# Patient Record
Sex: MALE | Race: WHITE | NOT HISPANIC OR LATINO | Employment: UNEMPLOYED | URBAN - NONMETROPOLITAN AREA
[De-identification: names, ages, dates, MRNs, and addresses within clinical notes are randomized per-mention and may not be internally consistent; named-entity substitution may affect disease eponyms.]

---

## 2020-03-07 ENCOUNTER — HOSPITAL ENCOUNTER (EMERGENCY)
Facility: HOSPITAL | Age: 30
Discharge: HOME | End: 2020-03-07
Admitting: EMERGENCY MEDICINE

## 2020-03-07 VITALS
TEMPERATURE: 97.8 F | RESPIRATION RATE: 16 BRPM | HEIGHT: 72 IN | DIASTOLIC BLOOD PRESSURE: 74 MMHG | WEIGHT: 212 LBS | SYSTOLIC BLOOD PRESSURE: 149 MMHG | OXYGEN SATURATION: 98 % | BODY MASS INDEX: 28.71 KG/M2 | HEART RATE: 73 BPM

## 2020-03-07 DIAGNOSIS — J02.9 PHARYNGITIS, UNSPECIFIED ETIOLOGY: Primary | ICD-10-CM

## 2020-03-07 DIAGNOSIS — J40 BRONCHITIS: ICD-10-CM

## 2020-03-07 LAB
FLUAV AG NPH QL: NEGATIVE
FLUBV AG NPH QL IA: NEGATIVE
S PYO AG THROAT QL: NEGATIVE

## 2020-03-07 PROCEDURE — 87804 INFLUENZA ASSAY W/OPTIC: CPT | Performed by: NURSE PRACTITIONER

## 2020-03-07 PROCEDURE — 87081 CULTURE SCREEN ONLY: CPT | Performed by: NURSE PRACTITIONER

## 2020-03-07 PROCEDURE — 87880 STREP A ASSAY W/OPTIC: CPT | Performed by: NURSE PRACTITIONER

## 2020-03-07 PROCEDURE — 99283 EMERGENCY DEPT VISIT LOW MDM: CPT

## 2020-03-07 RX ORDER — BENZONATATE 200 MG/1
200 CAPSULE ORAL 3 TIMES DAILY PRN
Qty: 15 CAPSULE | Refills: 0 | Status: SHIPPED | OUTPATIENT
Start: 2020-03-07 | End: 2020-07-14

## 2020-03-07 RX ORDER — CETIRIZINE HYDROCHLORIDE, PSEUDOEPHEDRINE HYDROCHLORIDE 5; 120 MG/1; MG/1
1 TABLET, FILM COATED, EXTENDED RELEASE ORAL 2 TIMES DAILY
Qty: 15 TABLET | Refills: 0 | Status: SHIPPED | OUTPATIENT
Start: 2020-03-07 | End: 2020-07-14

## 2020-03-07 RX ORDER — AZITHROMYCIN 250 MG/1
TABLET, FILM COATED ORAL
Qty: 6 TABLET | Refills: 0 | Status: SHIPPED | OUTPATIENT
Start: 2020-03-07 | End: 2020-07-14

## 2020-03-07 RX ORDER — ESCITALOPRAM OXALATE 20 MG/1
20 TABLET ORAL 2 TIMES DAILY
COMMUNITY
End: 2022-09-22

## 2020-03-07 RX ORDER — ONDANSETRON 4 MG/1
4 TABLET, ORALLY DISINTEGRATING ORAL EVERY 6 HOURS PRN
Qty: 10 TABLET | Refills: 0 | Status: SHIPPED | OUTPATIENT
Start: 2020-03-07 | End: 2020-07-14

## 2020-03-07 RX ORDER — FLUTICASONE PROPIONATE 50 MCG
2 SPRAY, SUSPENSION (ML) NASAL DAILY
Qty: 9.9 ML | Refills: 0 | Status: SHIPPED | OUTPATIENT
Start: 2020-03-07 | End: 2020-07-14
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
## 2020-03-09 LAB — BACTERIA SPEC AEROBE CULT: NORMAL

## 2020-05-06 ENCOUNTER — HOSPITAL ENCOUNTER (EMERGENCY)
Age: 30
Discharge: HOME | End: 2020-05-06
Payer: MEDICAID

## 2020-05-06 ENCOUNTER — APPOINTMENT (OUTPATIENT)
Dept: GENERAL RADIOLOGY | Age: 30
End: 2020-05-06
Payer: MEDICAID

## 2020-05-06 VITALS
BODY MASS INDEX: 27.09 KG/M2 | OXYGEN SATURATION: 96 % | TEMPERATURE: 97.3 F | WEIGHT: 200 LBS | RESPIRATION RATE: 18 BRPM | HEIGHT: 72 IN | HEART RATE: 86 BPM

## 2020-05-06 PROCEDURE — 99283 EMERGENCY DEPT VISIT LOW MDM: CPT

## 2020-05-06 PROCEDURE — 73660 X-RAY EXAM OF TOE(S): CPT

## 2020-05-06 RX ORDER — ESCITALOPRAM OXALATE 10 MG/1
10 TABLET ORAL DAILY
COMMUNITY
End: 2020-12-03

## 2020-05-06 RX ORDER — METHYLPREDNISOLONE 4 MG/1
TABLET ORAL
Qty: 1 KIT | Refills: 0 | Status: SHIPPED | OUTPATIENT
Start: 2020-05-06 | End: 2020-05-12

## 2020-05-06 RX ORDER — SULFAMETHOXAZOLE AND TRIMETHOPRIM 800; 160 MG/1; MG/1
1 TABLET ORAL 2 TIMES DAILY
Qty: 20 TABLET | Refills: 0 | Status: SHIPPED | OUTPATIENT
Start: 2020-05-06 | End: 2020-05-16

## 2020-05-06 RX ORDER — MELOXICAM 15 MG/1
15 TABLET ORAL DAILY
Qty: 30 TABLET | Refills: 0 | Status: SHIPPED | OUTPATIENT
Start: 2020-05-06 | End: 2020-12-03

## 2020-05-06 NOTE — ED PROVIDER NOTES
PM      PATIENT REFERRED TO:  [facility] EMERGENCY DEPT  [facility] [place]  [phone]    If symptoms worsen    Quirino Boeck, DPM  [address]  [phone]    Schedule an appointment as soon as possible for a visit         DISCHARGE MEDICATIONS:  Discharge Medication List as of 5/6/2020  6:22 PM      START taking these medications    Details   methylPREDNISolone (MEDROL, ADAMA,) 4 MG tablet Take by mouth., Disp-1 kit, R-0Print      meloxicam (MOBIC) 15 MG tablet Take 1 tablet by mouth daily, Disp-30 tablet, R-0Print      sulfamethoxazole-trimethoprim (BACTRIM DS) 800-160 MG per tablet Take 1 tablet by mouth 2 times daily for 10 days, Disp-20 tablet, R-0Print             (Please note that portions of this note were completed with a voice recognition program.  Efforts were made to edit the dictations but occasionallywords are mis-transcribed.)    Carie Yusuf [address] [place] [address]  05/07/20 5023
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
## 2020-05-07 ASSESSMENT — ENCOUNTER SYMPTOMS
COUGH: 0
PHOTOPHOBIA: 0
EYE ITCHING: 0
APNEA: 0
COLOR CHANGE: 0
BACK PAIN: 0
SHORTNESS OF BREATH: 0
EYE DISCHARGE: 0

## 2020-07-14 ENCOUNTER — OFFICE VISIT (OUTPATIENT)
Dept: INTERNAL MEDICINE | Facility: CLINIC | Age: 30
End: 2020-07-14

## 2020-07-14 VITALS
BODY MASS INDEX: 29.63 KG/M2 | OXYGEN SATURATION: 98 % | WEIGHT: 207 LBS | DIASTOLIC BLOOD PRESSURE: 80 MMHG | HEART RATE: 86 BPM | SYSTOLIC BLOOD PRESSURE: 120 MMHG | HEIGHT: 70 IN | TEMPERATURE: 98.4 F

## 2020-07-14 DIAGNOSIS — Z76.89 ENCOUNTER TO ESTABLISH CARE: Primary | ICD-10-CM

## 2020-07-14 PROCEDURE — 99211 OFF/OP EST MAY X REQ PHY/QHP: CPT | Performed by: NURSE PRACTITIONER
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
## 2020-08-26 ENCOUNTER — TELEPHONE (OUTPATIENT)
Dept: INTERNAL MEDICINE | Facility: CLINIC | Age: 30
End: 2020-08-26

## 2020-08-26 NOTE — TELEPHONE ENCOUNTER
Pt stopped by ofc requesting a release to return to work. Pt would not disclose any info. I advised pt he will need an appt to be evaluated, pt refused.    Please call pt at [phone].
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
## 2020-12-03 ENCOUNTER — HOSPITAL ENCOUNTER (EMERGENCY)
Age: 30
Discharge: HOME | End: 2020-12-03
Payer: MEDICAID

## 2020-12-03 VITALS
DIASTOLIC BLOOD PRESSURE: 89 MMHG | OXYGEN SATURATION: 98 % | HEART RATE: 84 BPM | RESPIRATION RATE: 16 BRPM | TEMPERATURE: 96.5 F | SYSTOLIC BLOOD PRESSURE: 136 MMHG

## 2020-12-03 PROCEDURE — 99283 EMERGENCY DEPT VISIT LOW MDM: CPT

## 2020-12-03 PROCEDURE — 10060 I&D ABSCESS SIMPLE/SINGLE: CPT

## 2020-12-03 RX ORDER — SULFAMETHOXAZOLE AND TRIMETHOPRIM 800; 160 MG/1; MG/1
1 TABLET ORAL 2 TIMES DAILY
Qty: 20 TABLET | Refills: 0 | Status: SHIPPED | OUTPATIENT
Start: 2020-12-03 | End: 2020-12-13

## 2020-12-03 ASSESSMENT — PAIN DESCRIPTION - LOCATION: LOCATION: FACE

## 2020-12-03 ASSESSMENT — PAIN DESCRIPTION - DESCRIPTORS: DESCRIPTORS: THROBBING;SHARP

## 2020-12-03 ASSESSMENT — ENCOUNTER SYMPTOMS
VOMITING: 0
ABDOMINAL PAIN: 0
COLOR CHANGE: 0

## 2020-12-03 ASSESSMENT — PAIN SCALES - GENERAL: PAINLEVEL_OUTOF10: 6

## 2020-12-03 NOTE — ED NOTES
Patient declined dressing over wound, patient also declined Bactrim and DC papers, no distress noted, no active bleeding from abscess incision     Eric Gupta, RN  12/03/20 91 Ally Vasquez, RN  12/03/20 5403

## 2020-12-03 NOTE — ED PROVIDER NOTES
[facility] EMERGENCY DEPT  eMERGENCY dEPARTMENT eNCOUnter      Pt Name: Cj Puga  MRN: [id number]  [place] 1990  Date of evaluation: 12/3/2020  Provider: Kristina Sahu, [address]       Chief Complaint   Patient presents with    Abscess     to chin \"for while\"         HISTORY OF PRESENT ILLNESS   (Location/Symptom, Timing/Onset,Context/Setting, Quality, Duration, Modifying Factors, Severity)  Note limiting factors. Cj Puga is a 26 y.o. male who presents to the emergency department with  An abscess to his chin. HAs been there for a year but just recently became sore and red. Pt squeezed out thick pus. HAS had abscesses cut open before. No fever or vomtiing. Pt insists that it needs to be cut open. \"otherwise I will take a knife to it myself\"    The history is provided by the patient. Abscess   Location:  Face  Facial abscess location:  Chin  Size:  2  Abscess quality: painful and redness    Red streaking: no    Duration:  3 days  Progression:  Worsening  Pain details:     Timing:  Constant  Chronicity:  Chronic  Associated symptoms: no fever and no vomiting    Risk factors: prior abscess        NursingNotes were reviewed. REVIEW OF SYSTEMS    (2-9 systems for level 4, 10 or more for level 5)     Review of Systems   Constitutional: Negative for activity change and fever. Cardiovascular: Negative for chest pain. Gastrointestinal: Negative for abdominal pain and vomiting. Skin: Positive for wound. Negative for color change. Except as noted above the remainder of the review of systems was reviewed and negative.        PAST MEDICAL HISTORY     Past Medical History:   Diagnosis Date    Anxiety     PTSD (post-traumatic stress disorder)          SURGICALHISTORY       Past Surgical History:   Procedure Laterality Date    ARM SURGERY Left     FOOT SURGERY Right     WRIST SURGERY Left          CURRENT MEDICATIONS       Discharge Medication List as of 12/3/2020 4:22 PM          ALLERGIES     Nuts [peanut-containing drug products]    FAMILY HISTORY     History reviewed. No pertinent family history. SOCIAL HISTORY       Social History     Socioeconomic History    Marital status:      Spouse name: None    Number of children: None    Years of education: None    Highest education level: None   Occupational History    None   Social Needs    Financial resource strain: None    Food insecurity     Worry: None     Inability: None    Transportation needs     Medical: None     Non-medical: None   Tobacco Use    Smoking status: Current Every Day Smoker     Packs/day: 1.00     Types: Cigarettes    Smokeless tobacco: Never Used   Substance and Sexual Activity    Alcohol use: Not Currently    Drug use: Yes     Types: Marijuana    Sexual activity: None   Lifestyle    Physical activity     Days per week: None     Minutes per session: None    Stress: None   Relationships    Social connections     Talks on phone: None     Gets together: None     Attends Congregational service: None     Active member of club or organization: None     Attends meetings of clubs or organizations: None     Relationship status: None    Intimate partner violence     Fear of current or ex partner: None     Emotionally abused: None     Physically abused: None     Forced sexual activity: None   Other Topics Concern    None   Social History Narrative    None       SCREENINGS      @FLOW([id number])@      PHYSICAL EXAM    (up to 7 for level 4, 8 or more for level 5)     ED Triage Vitals [12/03/20 1531]   BP Temp Temp src Pulse Resp SpO2 Height Weight   136/89 96.5 °F (35.8 °C) -- 84 16 98 % -- --       Physical Exam  Vitals signs and nursing note reviewed. Constitutional:       Appearance: He is well-developed. HENT:      Head: Normocephalic and atraumatic. Eyes:      General: No scleral icterus. Right eye: No discharge. Left eye: No discharge.    Neck:      Musculoskeletal: Normal range of motion and neck supple. Cardiovascular:      Rate and Rhythm: Normal rate. Pulmonary:      Effort: No respiratory distress. Neurological:      Mental Status: He is alert. Psychiatric:         Behavior: Behavior normal.         DIAGNOSTIC RESULTS     EKG: All EKG's are interpreted by the Emergency Department Physician who either signs or Co-signsthis chart in the absence of a cardiologist.        RADIOLOGY:   Non-plain filmimages such as CT, Ultrasound and MRI are read by the radiologist. Plain radiographic images are visualized and preliminarily interpreted by the emergency physician with the below findings:      Interpretation per the Radiologist below, if available at the time of this note:    No orders to display         ED BEDSIDEULTRASOUND:   Performed by ED Physician -none    LABS:  Labs Reviewed - No data to display    All other labs were within normal range or not returned as of this dictation. EMERGENCY DEPARTMENT COURSE and DIFFERENTIALDIAGNOSIS/MDM:   Vitals:    Vitals:    12/03/20 1531   BP: 136/89   Pulse: 84   Resp: 16   Temp: 96.5 °F (35.8 °C)   SpO2: 98%           MDM  Pt left before gettiing his papers or antibiotics      CONSULTS:  None    PROCEDURES:  Unless otherwise noted below, none     Incision/Drainage    Date/Time: 12/3/2020 4:16 PM  Performed by: TAMIKO Quiñonez  Authorized by: TAMIKO Quiñonez     Consent:     Consent obtained:  Verbal    Consent given by:  Patient    Risks discussed:  Incomplete drainage  Location:     Type:  Abscess    Size:  2    Location:  Head    Head/neck location: chin. Pre-procedure details:     Skin preparation:  Betadine  Anesthesia (see MAR for exact dosages): Anesthesia method:  Local infiltration    Local anesthetic:  Lidocaine 1% w/o epi  Procedure details:     Incision types:  Stab incision    Incision depth:  Dermal    Scalpel blade:  11    Drainage:  Bloody    Drainage amount:   Moderate    Wound treatment:  Wound left open  Post-procedure details:     Patient tolerance of procedure: Tolerated well, no immediate complications        FINAL IMPRESSION      1. Sebaceous cyst        DISPOSITION/PLAN   DISPOSITION        PATIENT REFERRED TO:  [facility]   [facility] [id number] [phone]          DISCHARGE MEDICATIONS:  Discharge Medication List as of 12/3/2020  4:22 PM      START taking these medications    Details   sulfamethoxazole-trimethoprim (BACTRIM DS;SEPTRA DS) 800-160 MG per tablet Take 1 tablet by mouth 2 times daily for 10 days, Disp-20 tablet,R-0Print                (Please note that portions of this note were completed with a voice recognitionprogram.  Efforts were made to edit the dictations but occasionally words are mis-transcribed.)    TAMIKO Quiñonez (electronically signed)         TAMIKO Quiñonez  12/03/20 0813

## 2021-02-02 ENCOUNTER — HOSPITAL ENCOUNTER (EMERGENCY)
Age: 31
Discharge: HOME | End: 2021-02-02
Payer: MEDICAID

## 2021-02-02 VITALS
RESPIRATION RATE: 18 BRPM | OXYGEN SATURATION: 96 % | DIASTOLIC BLOOD PRESSURE: 96 MMHG | WEIGHT: 230 LBS | TEMPERATURE: 97.2 F | BODY MASS INDEX: 31.15 KG/M2 | HEIGHT: 72 IN | HEART RATE: 83 BPM | SYSTOLIC BLOOD PRESSURE: 137 MMHG

## 2021-02-02 DIAGNOSIS — M79.605 PAIN OF BACK AND LEFT LOWER EXTREMITY: Primary | ICD-10-CM

## 2021-02-02 DIAGNOSIS — M54.9 PAIN OF BACK AND LEFT LOWER EXTREMITY: Primary | ICD-10-CM

## 2021-02-02 PROCEDURE — 99282 EMERGENCY DEPT VISIT SF MDM: CPT

## 2021-02-02 RX ORDER — KETOROLAC TROMETHAMINE 10 MG/1
10 TABLET, FILM COATED ORAL EVERY 6 HOURS PRN
Qty: 20 TABLET | Refills: 0 | Status: SHIPPED | OUTPATIENT
Start: 2021-02-02 | End: 2022-09-18

## 2021-02-02 RX ORDER — KETOROLAC TROMETHAMINE 30 MG/ML
30 INJECTION, SOLUTION INTRAMUSCULAR; INTRAVENOUS ONCE
Status: DISCONTINUED | OUTPATIENT
Start: 2021-02-02 | End: 2021-02-02 | Stop reason: HOSPADM

## 2021-02-02 RX ORDER — PREDNISONE 10 MG/1
10 TABLET ORAL DAILY
Qty: 10 TABLET | Refills: 0 | Status: SHIPPED | OUTPATIENT
Start: 2021-02-02 | End: 2021-02-12

## 2021-02-02 RX ORDER — DEXAMETHASONE SODIUM PHOSPHATE 4 MG/ML
4 INJECTION, SOLUTION INTRA-ARTICULAR; INTRALESIONAL; INTRAMUSCULAR; INTRAVENOUS; SOFT TISSUE ONCE
Status: DISCONTINUED | OUTPATIENT
Start: 2021-02-02 | End: 2021-02-02 | Stop reason: HOSPADM

## 2021-02-02 ASSESSMENT — PAIN SCALES - GENERAL: PAINLEVEL_OUTOF10: 9

## 2021-02-02 ASSESSMENT — ENCOUNTER SYMPTOMS
COUGH: 0
PHOTOPHOBIA: 0
COLOR CHANGE: 0
BACK PAIN: 1
SHORTNESS OF BREATH: 0
EYE DISCHARGE: 0
EYE ITCHING: 0
APNEA: 0
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
## 2021-02-03 NOTE — ED NOTES
Pt declined toradol and decadron stating \"If that's all yall are gonna do, I'm leaving\"   PA notified.    Pt [facility], RN  02/02/21 2034

## 2021-02-03 NOTE — ED NOTES
Prescriptions placed in shred box with Ching Moore RN; pt left before dc teaching       Cj Lyon RN  02/02/21 2042

## 2021-08-12 ENCOUNTER — HOSPITAL ENCOUNTER (EMERGENCY)
Age: 31
Discharge: HOME | End: 2021-08-12
Payer: MEDICARE

## 2021-08-12 VITALS
HEART RATE: 83 BPM | RESPIRATION RATE: 18 BRPM | OXYGEN SATURATION: 95 % | TEMPERATURE: 98.7 F | SYSTOLIC BLOOD PRESSURE: 127 MMHG | DIASTOLIC BLOOD PRESSURE: 86 MMHG

## 2021-08-12 DIAGNOSIS — M25.532 WRIST PAIN, LEFT: ICD-10-CM

## 2021-08-12 DIAGNOSIS — G89.29 TOE PAIN, CHRONIC, RIGHT: Primary | ICD-10-CM

## 2021-08-12 DIAGNOSIS — G43.909 MIGRAINE WITHOUT STATUS MIGRAINOSUS, NOT INTRACTABLE, UNSPECIFIED MIGRAINE TYPE: ICD-10-CM

## 2021-08-12 DIAGNOSIS — M79.674 TOE PAIN, CHRONIC, RIGHT: Primary | ICD-10-CM

## 2021-08-12 PROCEDURE — 99282 EMERGENCY DEPT VISIT SF MDM: CPT

## 2021-08-12 RX ORDER — IBUPROFEN 600 MG/1
600 TABLET ORAL 4 TIMES DAILY PRN
Qty: 40 TABLET | Refills: 0 | Status: SHIPPED | OUTPATIENT
Start: 2021-08-12 | End: 2022-09-18

## 2021-08-12 ASSESSMENT — PAIN DESCRIPTION - LOCATION: LOCATION: HEAD

## 2021-08-12 ASSESSMENT — PAIN SCALES - GENERAL: PAINLEVEL_OUTOF10: 7
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
## 2021-08-12 NOTE — ED TRIAGE NOTES
Pt here with multiple complaints.   Pt notes pain to right foot toes, wants to have them amputated, pain to left wrist and headache that is worse than his baseline
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
## 2022-08-26 ENCOUNTER — HOSPITAL ENCOUNTER (EMERGENCY)
Age: 32
Discharge: HOME | End: 2022-08-26
Payer: MEDICARE

## 2022-08-26 VITALS
HEART RATE: 75 BPM | RESPIRATION RATE: 18 BRPM | WEIGHT: 230 LBS | SYSTOLIC BLOOD PRESSURE: 121 MMHG | OXYGEN SATURATION: 95 % | DIASTOLIC BLOOD PRESSURE: 78 MMHG | BODY MASS INDEX: 31.19 KG/M2 | TEMPERATURE: 98 F

## 2022-08-26 DIAGNOSIS — T50.Z95A ADVERSE EFFECT OF VACCINE, INITIAL ENCOUNTER: ICD-10-CM

## 2022-08-26 DIAGNOSIS — R19.7 DIARRHEA, UNSPECIFIED TYPE: ICD-10-CM

## 2022-08-26 DIAGNOSIS — Z20.822 COVID-19 VIRUS NOT DETECTED: Primary | ICD-10-CM

## 2022-08-26 LAB — SARS-COV-2, NAAT: NOT DETECTED

## 2022-08-26 PROCEDURE — 99283 EMERGENCY DEPT VISIT LOW MDM: CPT

## 2022-08-26 PROCEDURE — 87635 SARS-COV-2 COVID-19 AMP PRB: CPT

## 2022-08-26 ASSESSMENT — ENCOUNTER SYMPTOMS
EYES NEGATIVE: 1
VOMITING: 0
ABDOMINAL DISTENTION: 0
BLOOD IN STOOL: 0
DIARRHEA: 1
CONSTIPATION: 0
NAUSEA: 1
SORE THROAT: 0
RESPIRATORY NEGATIVE: 1
COUGH: 0
ABDOMINAL PAIN: 0

## 2022-08-26 ASSESSMENT — PAIN - FUNCTIONAL ASSESSMENT
PAIN_FUNCTIONAL_ASSESSMENT: NONE - DENIES PAIN
PAIN_FUNCTIONAL_ASSESSMENT: NONE - DENIES PAIN

## 2022-08-26 NOTE — DISCHARGE INSTRUCTIONS
Today your COVID test is coming back negative. It is likely that your symptoms this morning were a reaction to the vaccine. Please continue to stay well rested, well hydrated, use Tylenol and Motrin as needed for body aches and fevers. Should you be able to produce a stool sample please return it within the next 48 hours for further testing. Please avoid any antidiarrheal medications as these will keep toxins in your body longer than normal.  Please follow-up with your primary care provider within the next 48 hours for reevaluation. Should she develop any new or worsening symptoms please return to the ER for further evaluation.

## 2022-08-26 NOTE — Clinical Note
Janki Ayon was seen and treated in our emergency department on 8/26/2022. He may return to work on 08/27/2022. Mr. Janki Ayon was seen in the ED today for a likely vaccine reaction. He is COVID negative. He will require further stool testing to rule out salmonella. He is medically cleared to return to work at this time. If you have any questions or concerns, please don't hesitate to call.       Danilo Dallas PA-C

## 2022-08-26 NOTE — ED PROVIDER NOTES
[facility] EMERGENCY DEPT  EMERGENCY DEPARTMENT ENCOUNTER      Pt Name: Surya Hu  MRN: [id number]  [place] 1990  Date of evaluation: 8/26/2022  Provider: Gaetano Gaucher, PA-C    CHIEF COMPLAINT       Chief Complaint   Patient presents with    Concern For COVID-19         HISTORY OF PRESENT ILLNESS   (Location/Symptom, Timing/Onset, Context/Setting, Quality, Duration, Modifying Factors, Severity)  Note limiting factors. HPI      Surya Hu is a 27 y.o. male who presents to the emergency department with flulike symptoms. PMH significant for PTSD and anxiety. Patient states on 7/25/2022 he began working at the [employer] and the following day received his first [employer]. Patient states yesterday morning he woke up with sudden onset of body aches, nausea, diarrhea, and \"just feeling bad. \"  Patient states that he was at work today where he received his second dose of the Moderna vaccine and 15 minutes later had lightheadedness and felt as though he was near syncopal.  Patient denies falling, striking his head, or any syncopal events. Patient states that this was a witnessed event by his coworkers for which he was advised to seek further evaluation. Patient denies any abdominal pain, fevers, chills, diaphoresis, or vomiting. Patient states over the past week is his first exposure to the chickens however he denies any other known sick contact. Patient denies any medication use for his symptoms. Records reviewed show patient last seen in the ED on 8/12/2021 for chronic right toe pain, wrist pain, migraine. No outpatient visits since. Nursing Notes were reviewed. REVIEW OF SYSTEMS    (2-9 systems for level 4, 10 or more for level 5)     Review of Systems   Constitutional: Negative. Negative for chills, diaphoresis and fever. HENT:  Positive for congestion. Negative for sore throat. Eyes: Negative. Respiratory: Negative. Negative for cough. Cardiovascular: Negative. Gastrointestinal:  Positive for diarrhea and nausea. Negative for abdominal distention, abdominal pain, blood in stool, constipation and vomiting. Genitourinary: Negative. Musculoskeletal:  Positive for myalgias. Skin: Negative. Neurological:  Positive for light-headedness and headaches. Negative for syncope and weakness. Psychiatric/Behavioral: Negative. All other systems reviewed and are negative. Except as noted above the remainder of the review of systems was reviewed and negative. PAST MEDICAL HISTORY     Past Medical History:   Diagnosis Date    Anxiety     PTSD (post-traumatic stress disorder)          SURGICAL HISTORY       Past Surgical History:   Procedure Laterality Date    ANKLE SURGERY Right     ARM SURGERY Left     FOOT SURGERY Right     HIP SURGERY Right     WRIST SURGERY Left          CURRENT MEDICATIONS       Discharge Medication List as of 8/26/2022  2:54 PM        CONTINUE these medications which have NOT CHANGED    Details   ibuprofen (ADVIL;MOTRIN) 600 MG tablet Take 1 tablet by mouth 4 times daily as needed for Pain, Disp-40 tablet, R-0Normal      ketorolac (TORADOL) 10 MG tablet Take 1 tablet by mouth every 6 hours as needed for Pain, Disp-20 tablet, R-0Print             ALLERGIES     Nuts [peanut-containing drug products]    FAMILY HISTORY     No family history on file.        SOCIAL HISTORY       Social History     Socioeconomic History    Marital status:    Tobacco Use    Smoking status: Every Day     Packs/day: 1.00     Types: Cigarettes    Smokeless tobacco: Never   Substance and Sexual Activity    Alcohol use: Not Currently    Drug use: Yes     Types: Marijuana (Weed)       SCREENINGS         [place] Coma Scale  Eye Opening: Spontaneous  Best Verbal Response: Oriented  Best Motor Response: Obeys commands  [place] Coma Scale Score: 15                     CIWA Assessment  BP: 121/78  Heart Rate: 75                 PHYSICAL EXAM (up to 7 for level 4, 8 or more for level 5)     ED Triage Vitals [08/26/22 1216]   BP Temp Temp src Heart Rate Resp SpO2 Height Weight   121/78 98 °F (36.7 °C) -- 75 18 95 % -- 230 lb (104.3 kg)       Physical Exam  Vitals and nursing note reviewed. Constitutional:       General: He is not in acute distress. Appearance: Normal appearance. He is well-developed, well-groomed and overweight. He is not toxic-appearing or diaphoretic. HENT:      Head: Normocephalic. Right Ear: External ear normal.      Left Ear: External ear normal.      Mouth/Throat:      Mouth: Mucous membranes are moist.      Pharynx: Oropharynx is clear. Eyes:      Extraocular Movements: Extraocular movements intact. Conjunctiva/sclera: Conjunctivae normal.      Pupils: Pupils are equal, round, and reactive to light. Cardiovascular:      Rate and Rhythm: Normal rate and regular rhythm. Pulmonary:      Effort: Pulmonary effort is normal.      Breath sounds: Normal breath sounds. Abdominal:      General: Bowel sounds are normal.      Palpations: Abdomen is soft. Tenderness: There is no abdominal tenderness. There is no right CVA tenderness or left CVA tenderness. Musculoskeletal:         General: Normal range of motion. Cervical back: Normal range of motion and neck supple. Right lower leg: No edema. Left lower leg: No edema. Skin:     General: Skin is warm and dry. Neurological:      Mental Status: He is alert and oriented to person, place, and time. Gait: Gait normal.   Psychiatric:         Attention and Perception: Attention normal.         Mood and Affect: Mood is anxious. Behavior: Behavior is agitated. Behavior is cooperative. Thought Content:  Thought content normal.       DIAGNOSTIC RESULTS         RADIOLOGY:   Non-plain film images such as CT, Ultrasound and MRI are read by the radiologist. Plain radiographic images are visualized and preliminarily interpreted by the emergency physician with the below findings:        Interpretation per the Radiologist below, if available at the time of this note:    No orders to display         LABS:  Labs Reviewed   COVID-19, RAPID       All other labs were within normal range or not returned as of this dictation. EMERGENCY DEPARTMENT COURSE and DIFFERENTIAL DIAGNOSIS/MDM:   Vitals:    Vitals:    08/26/22 1216   BP: 121/78   Pulse: 75   Resp: 18   Temp: 98 °F (36.7 °C)   SpO2: 95%   Weight: 230 lb (104.3 kg)           MDM     Amount and/or Complexity of Data Reviewed  Clinical lab tests: ordered and reviewed  Decide to obtain previous medical records or to obtain history from someone other than the patient: yes  Review and summarize past medical records: yes    Patient Progress  Patient progress: improved        REASSESSMENT        CONSULTS:  None    PROCEDURES:  Unless otherwise noted below, none     Procedures        Federico Velasquez is a 27 y.o. male who presents to the emergency department with flulike symptoms. PMH significant for PTSD and anxiety. After initial evaluation discussed with patient that his COVID test is coming back negative. Discussed that this is a likely reaction to his vaccine however recommended work-up to include labs as well as stool testing. Discussed with patient that due to his drug exposure he is at high risk for Salmonella. Offered treatment with IV fluids, antiemetics, as well as anti-inflammatories. Patient declines at this time stating that he \"really would not of come if my work did make me. \"  Discussed with patient risk, benefits, and alternatives. Advised inability to rule out life-threatening causes of his symptoms. Patient is able to verbalize this back, is alert and oriented x3, and politely declines work-up stating that he presented for COVID testing and clearance to return to work.   Advised patient significant importance of having a primary care provider and need for follow-up within the next 24 to 48 hours. Discussed need for symptomatic treatment of viral illness symptoms including rest, hydration, and avoidance of antidiarrheal medications. FINAL IMPRESSION      1. COVID-19 virus not detected    2. Adverse effect of vaccine, initial encounter    3. Diarrhea, unspecified type          DISPOSITION/PLAN   DISPOSITION Decision To Discharge 08/26/2022 02:50:35 PM      PATIENT REFERRED TO:  [address] [place] [id number]  [phone]  Schedule an appointment as soon as possible for a visit in 2 days      [facility] EM[facility]GENC[facility] DEPT  [facility]  [phone]    As needed      DISCHARGE MEDICATIONS:  Discharge Medication List as of 8/26/2022  2:54 PM        Controlled Substances Monitoring:     No flowsheet data found.     (Please note that portions of this note were completed with a voice recognition program.  Efforts were made to edit the dictations but occasionally words are mis-transcribed.)    Radha Gillespie PA-C (electronically signed)           Radha iGllespie PA-C  08/26/22 9936

## 2022-09-18 ENCOUNTER — APPOINTMENT (OUTPATIENT)
Dept: CT IMAGING | Age: 32
End: 2022-09-18
Payer: OTHER MISCELLANEOUS

## 2022-09-18 ENCOUNTER — HOSPITAL ENCOUNTER (EMERGENCY)
Age: 32
Discharge: HOME | End: 2022-09-19
Attending: EMERGENCY MEDICINE
Payer: OTHER MISCELLANEOUS

## 2022-09-18 DIAGNOSIS — T07.XXXA MULTIPLE CONTUSIONS: ICD-10-CM

## 2022-09-18 DIAGNOSIS — S09.90XA INJURY OF HEAD, INITIAL ENCOUNTER: ICD-10-CM

## 2022-09-18 DIAGNOSIS — S16.1XXA STRAIN OF NECK MUSCLE, INITIAL ENCOUNTER: ICD-10-CM
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
## 2022-09-18 LAB
HCT VFR BLD CALC: 47.1 % (ref 42–52)
HEMOGLOBIN: 15.5 G/DL (ref 14–18)
MCH RBC QN AUTO: 31.2 PG (ref 27–31)
MCHC RBC AUTO-ENTMCNC: 32.9 G/DL (ref 33–37)
MCV RBC AUTO: 94.8 FL (ref 80–94)
PDW BLD-RTO: 13.2 % (ref 11.5–14.5)
PLATELET # BLD: 317 K/UL (ref 130–400)
PMV BLD AUTO: 8.6 FL (ref 9.4–12.4)
RBC # BLD: 4.97 M/UL (ref 4.7–6.1)
WBC # BLD: 10.4 K/UL (ref 4.8–10.8)

## 2022-09-18 PROCEDURE — 99285 EMERGENCY DEPT VISIT HI MDM: CPT

## 2022-09-18 PROCEDURE — 72125 CT NECK SPINE W/O DYE: CPT | Performed by: RADIOLOGY

## 2022-09-18 PROCEDURE — 70450 CT HEAD/BRAIN W/O DYE: CPT

## 2022-09-18 PROCEDURE — 70486 CT MAXILLOFACIAL W/O DYE: CPT | Performed by: RADIOLOGY

## 2022-09-18 PROCEDURE — 72128 CT CHEST SPINE W/O DYE: CPT | Performed by: RADIOLOGY

## 2022-09-18 PROCEDURE — 72125 CT NECK SPINE W/O DYE: CPT

## 2022-09-18 PROCEDURE — 96374 THER/PROPH/DIAG INJ IV PUSH: CPT

## 2022-09-18 PROCEDURE — 72128 CT CHEST SPINE W/O DYE: CPT

## 2022-09-18 PROCEDURE — 2580000003 HC RX 258: Performed by: EMERGENCY MEDICINE

## 2022-09-18 PROCEDURE — 6360000002 HC RX W HCPCS: Performed by: EMERGENCY MEDICINE

## 2022-09-18 PROCEDURE — 96375 TX/PRO/DX INJ NEW DRUG ADDON: CPT

## 2022-09-18 PROCEDURE — 70450 CT HEAD/BRAIN W/O DYE: CPT | Performed by: RADIOLOGY

## 2022-09-18 PROCEDURE — 70486 CT MAXILLOFACIAL W/O DYE: CPT

## 2022-09-18 RX ORDER — ONDANSETRON 2 MG/ML
4 INJECTION INTRAMUSCULAR; INTRAVENOUS ONCE
Status: COMPLETED | OUTPATIENT
Start: 2022-09-18 | End: 2022-09-18

## 2022-09-18 RX ORDER — MORPHINE SULFATE 4 MG/ML
4 INJECTION, SOLUTION INTRAMUSCULAR; INTRAVENOUS ONCE
Status: COMPLETED | OUTPATIENT
Start: 2022-09-18 | End: 2022-09-18

## 2022-09-18 RX ORDER — SODIUM CHLORIDE 9 MG/ML
INJECTION, SOLUTION INTRAVENOUS CONTINUOUS
Status: DISCONTINUED | OUTPATIENT
Start: 2022-09-18 | End: 2022-09-19 | Stop reason: HOSPADM

## 2022-09-18 RX ADMIN — MORPHINE SULFATE 4 MG: 4 INJECTION, SOLUTION INTRAMUSCULAR; INTRAVENOUS at 23:49

## 2022-09-18 RX ADMIN — SODIUM CHLORIDE: 9 INJECTION, SOLUTION INTRAVENOUS at 23:34

## 2022-09-18 RX ADMIN — ONDANSETRON 4 MG: 2 INJECTION INTRAMUSCULAR; INTRAVENOUS at 23:33

## 2022-09-18 ASSESSMENT — ENCOUNTER SYMPTOMS
BACK PAIN: 1
ABDOMINAL PAIN: 0
DIARRHEA: 0
NAUSEA: 1
VOMITING: 1
SHORTNESS OF BREATH: 0
EYE PAIN: 0

## 2022-09-18 ASSESSMENT — PAIN DESCRIPTION - LOCATION: LOCATION: GENERALIZED

## 2022-09-18 ASSESSMENT — PAIN SCALES - GENERAL: PAINLEVEL_OUTOF10: 9

## 2022-09-19 ENCOUNTER — APPOINTMENT (OUTPATIENT)
Dept: CT IMAGING | Age: 32
End: 2022-09-19
Payer: OTHER MISCELLANEOUS

## 2022-09-19 ENCOUNTER — APPOINTMENT (OUTPATIENT)
Dept: GENERAL RADIOLOGY | Age: 32
End: 2022-09-19
Payer: OTHER MISCELLANEOUS

## 2022-09-19 VITALS
RESPIRATION RATE: 15 BRPM | HEART RATE: 62 BPM | TEMPERATURE: 98.3 F | OXYGEN SATURATION: 96 % | SYSTOLIC BLOOD PRESSURE: 127 MMHG | DIASTOLIC BLOOD PRESSURE: 75 MMHG | WEIGHT: 240 LBS | BODY MASS INDEX: 32.51 KG/M2 | HEIGHT: 72 IN

## 2022-09-19 LAB
ALBUMIN SERPL-MCNC: 4 G/DL (ref 3.5–5.2)
ALP BLD-CCNC: 65 U/L (ref 40–130)
ALT SERPL-CCNC: 11 U/L (ref 5–41)
AMPHETAMINE SCREEN, URINE: NEGATIVE
ANION GAP SERPL CALCULATED.3IONS-SCNC: 11 MMOL/L (ref 7–19)
AST SERPL-CCNC: 14 U/L (ref 5–40)
BARBITURATE SCREEN URINE: NEGATIVE
BENZODIAZEPINE SCREEN, URINE: NEGATIVE
BILIRUB SERPL-MCNC: 0.5 MG/DL (ref 0.2–1.2)
BUN BLDV-MCNC: 12 MG/DL (ref 6–20)
BUPRENORPHINE URINE: NEGATIVE
CALCIUM SERPL-MCNC: 9 MG/DL (ref 8.6–10)
CANNABINOID SCREEN URINE: POSITIVE
CHLORIDE BLD-SCNC: 104 MMOL/L (ref 98–111)
CO2: 22 MMOL/L (ref 22–29)
COCAINE METABOLITE SCREEN URINE: NEGATIVE
CREAT SERPL-MCNC: 1 MG/DL (ref 0.5–1.2)
ETHANOL: <10 MG/DL (ref 0–0.08)
GFR AFRICAN AMERICAN: >59
GFR NON-AFRICAN AMERICAN: >60
GLUCOSE BLD-MCNC: 118 MG/DL (ref 74–109)
LIPASE: 30 U/L (ref 13–60)
Lab: ABNORMAL
MAGNESIUM: 1.9 MG/DL (ref 1.6–2.6)
METHADONE SCREEN, URINE: NEGATIVE
METHAMPHETAMINE, URINE: NEGATIVE
OPIATE SCREEN URINE: POSITIVE
OXYCODONE URINE: NEGATIVE
PHENCYCLIDINE SCREEN URINE: NEGATIVE
POTASSIUM REFLEX MAGNESIUM: 3.2 MMOL/L (ref 3.5–5)
PROPOXYPHENE SCREEN: NEGATIVE
SODIUM BLD-SCNC: 137 MMOL/L (ref 136–145)
TOTAL PROTEIN: 6.4 G/DL (ref 6.6–8.7)
TRICYCLIC, URINE: NEGATIVE

## 2022-09-19 PROCEDURE — 73110 X-RAY EXAM OF WRIST: CPT

## 2022-09-19 PROCEDURE — 73610 X-RAY EXAM OF ANKLE: CPT

## 2022-09-19 PROCEDURE — 80053 COMPREHEN METABOLIC PANEL: CPT

## 2022-09-19 PROCEDURE — 36415 COLL VENOUS BLD VENIPUNCTURE: CPT

## 2022-09-19 PROCEDURE — 73590 X-RAY EXAM OF LOWER LEG: CPT

## 2022-09-19 PROCEDURE — 83690 ASSAY OF LIPASE: CPT

## 2022-09-19 PROCEDURE — 71260 CT THORAX DX C+: CPT | Performed by: RADIOLOGY

## 2022-09-19 PROCEDURE — 6360000004 HC RX CONTRAST MEDICATION: Performed by: EMERGENCY MEDICINE

## 2022-09-19 PROCEDURE — 73030 X-RAY EXAM OF SHOULDER: CPT

## 2022-09-19 PROCEDURE — 74177 CT ABD & PELVIS W/CONTRAST: CPT

## 2022-09-19 PROCEDURE — 83735 ASSAY OF MAGNESIUM: CPT

## 2022-09-19 PROCEDURE — 96375 TX/PRO/DX INJ NEW DRUG ADDON: CPT

## 2022-09-19 PROCEDURE — 6360000002 HC RX W HCPCS

## 2022-09-19 PROCEDURE — 6370000000 HC RX 637 (ALT 250 FOR IP): Performed by: EMERGENCY MEDICINE

## 2022-09-19 PROCEDURE — 6360000002 HC RX W HCPCS: Performed by: EMERGENCY MEDICINE

## 2022-09-19 PROCEDURE — 73560 X-RAY EXAM OF KNEE 1 OR 2: CPT

## 2022-09-19 PROCEDURE — 82077 ASSAY SPEC XCP UR&BREATH IA: CPT

## 2022-09-19 PROCEDURE — 85027 COMPLETE CBC AUTOMATED: CPT

## 2022-09-19 PROCEDURE — 31500 INSERT EMERGENCY AIRWAY: CPT

## 2022-09-19 PROCEDURE — 80306 DRUG TEST PRSMV INSTRMNT: CPT

## 2022-09-19 PROCEDURE — 73630 X-RAY EXAM OF FOOT: CPT

## 2022-09-19 PROCEDURE — 71260 CT THORAX DX C+: CPT

## 2022-09-19 PROCEDURE — 74177 CT ABD & PELVIS W/CONTRAST: CPT | Performed by: RADIOLOGY

## 2022-09-19 RX ORDER — POTASSIUM CHLORIDE 750 MG/1
10 TABLET, EXTENDED RELEASE ORAL ONCE
Status: COMPLETED | OUTPATIENT
Start: 2022-09-19 | End: 2022-09-19

## 2022-09-19 RX ORDER — CYCLOBENZAPRINE HCL 10 MG
10 TABLET ORAL 2 TIMES DAILY PRN
Qty: 20 TABLET | Refills: 0 | Status: SHIPPED | OUTPATIENT
Start: 2022-09-19 | End: 2022-09-29

## 2022-09-19 RX ORDER — ONDANSETRON 2 MG/ML
INJECTION INTRAMUSCULAR; INTRAVENOUS
Status: COMPLETED
Start: 2022-09-19 | End: 2022-09-19

## 2022-09-19 RX ORDER — ONDANSETRON 2 MG/ML
4 INJECTION INTRAMUSCULAR; INTRAVENOUS ONCE
Status: COMPLETED | OUTPATIENT
Start: 2022-09-19 | End: 2022-09-19

## 2022-09-19 RX ORDER — MORPHINE SULFATE 2 MG/ML
2 INJECTION, SOLUTION INTRAMUSCULAR; INTRAVENOUS ONCE
Status: COMPLETED | OUTPATIENT
Start: 2022-09-19 | End: 2022-09-19

## 2022-09-19 RX ADMIN — POTASSIUM CHLORIDE 10 MEQ: 750 TABLET, EXTENDED RELEASE ORAL at 04:19

## 2022-09-19 RX ADMIN — MORPHINE SULFATE 2 MG: 2 INJECTION, SOLUTION INTRAMUSCULAR; INTRAVENOUS at 02:10

## 2022-09-19 RX ADMIN — ONDANSETRON 4 MG: 2 INJECTION INTRAMUSCULAR; INTRAVENOUS at 00:10

## 2022-09-19 RX ADMIN — IOPAMIDOL 70 ML: 755 INJECTION, SOLUTION INTRAVENOUS at 00:36

## 2022-09-19 ASSESSMENT — PAIN SCALES - GENERAL
PAINLEVEL_OUTOF10: 3
PAINLEVEL_OUTOF10: 8
PAINLEVEL_OUTOF10: 6

## 2022-09-19 ASSESSMENT — PAIN - FUNCTIONAL ASSESSMENT: PAIN_FUNCTIONAL_ASSESSMENT: 0-10

## 2022-09-19 NOTE — ED NOTES
Pt drinking PO fluids without any nausea/vomiting. Pt voided 800ml clr gabriela urine. Dr Christofer Lim updated.      Melina Ramirez RN  09/19/22 [id number]

## 2022-09-19 NOTE — ED NOTES
Pt ambulated per self with steady, erect gait around the entire ED without complaints or acute distress.      Waldemar Forde RN  09/19/22 2896
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
## 2022-09-22 ENCOUNTER — TELEPHONE (OUTPATIENT)
Dept: INTERNAL MEDICINE | Facility: CLINIC | Age: 32
End: 2022-09-22

## 2022-09-22 ENCOUNTER — OFFICE VISIT (OUTPATIENT)
Dept: INTERNAL MEDICINE | Facility: CLINIC | Age: 32
End: 2022-09-22

## 2022-09-22 VITALS
HEART RATE: 88 BPM | WEIGHT: 217.4 LBS | DIASTOLIC BLOOD PRESSURE: 70 MMHG | BODY MASS INDEX: 31.12 KG/M2 | HEIGHT: 70 IN | TEMPERATURE: 97.3 F | OXYGEN SATURATION: 96 % | SYSTOLIC BLOOD PRESSURE: 126 MMHG
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
## 2022-09-22 DIAGNOSIS — Z76.89 ENCOUNTER TO ESTABLISH CARE: Primary | ICD-10-CM

## 2022-09-22 DIAGNOSIS — G89.29 CHRONIC BILATERAL LOW BACK PAIN WITHOUT SCIATICA: ICD-10-CM

## 2022-09-22 DIAGNOSIS — M54.50 CHRONIC BILATERAL LOW BACK PAIN WITHOUT SCIATICA: ICD-10-CM

## 2022-09-22 DIAGNOSIS — E55.9 VITAMIN D DEFICIENCY: ICD-10-CM

## 2022-09-22 DIAGNOSIS — M21.961 ACQUIRED ANKLE/FOOT DEFORMITY, RIGHT: ICD-10-CM

## 2022-09-22 DIAGNOSIS — F43.10 PTSD (POST-TRAUMATIC STRESS DISORDER): ICD-10-CM

## 2022-09-22 PROCEDURE — 96372 THER/PROPH/DIAG INJ SC/IM: CPT

## 2022-09-22 PROCEDURE — 99214 OFFICE O/P EST MOD 30 MIN: CPT

## 2022-09-22 RX ORDER — KETOROLAC TROMETHAMINE 30 MG/ML
60 INJECTION, SOLUTION INTRAMUSCULAR; INTRAVENOUS ONCE
Status: COMPLETED | OUTPATIENT
Start: 2022-09-22 | End: 2022-09-22

## 2022-09-22 RX ORDER — TRIAMCINOLONE ACETONIDE 40 MG/ML
40 INJECTION, SUSPENSION INTRA-ARTICULAR; INTRAMUSCULAR ONCE
Status: COMPLETED | OUTPATIENT
Start: 2022-09-22 | End: 2022-09-22

## 2022-09-22 RX ORDER — CYCLOBENZAPRINE HCL 10 MG
10 TABLET ORAL
COMMUNITY
Start: 2022-09-19 | End: 2022-09-30

## 2022-09-22 RX ADMIN — TRIAMCINOLONE ACETONIDE 40 MG: 40 INJECTION, SUSPENSION INTRA-ARTICULAR; INTRAMUSCULAR at 16:30

## 2022-09-22 RX ADMIN — KETOROLAC TROMETHAMINE 60 MG: 30 INJECTION, SOLUTION INTRAMUSCULAR; INTRAVENOUS at 16:30
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
## 2022-09-26 ENCOUNTER — PATIENT ROUNDING (BHMG ONLY) (OUTPATIENT)
Dept: INTERNAL MEDICINE | Facility: CLINIC | Age: 32
End: 2022-09-26

## 2022-09-26 NOTE — PROGRESS NOTES
September 26, 2022    Hello, may I speak with Alex Gary?    My name is Domenica      I am  with [facility]  [address] [place] [address] [phone].    Before we get started may I verify your date of birth? 1990    I am calling to officially welcome you to our practice and ask about your recent visit. Is this a good time to talk? yes    Tell me about your visit with us. What things went well?  It was good       We're always looking for ways to make our patients' experiences even better. Do you have recommendations on ways we may improve?  no    Overall were you satisfied with your first visit to our practice? yes       I appreciate you taking the time to speak with me today. Is there anything else I can do for you? Yes Can you please reschedule my blood work appt      Thank you, and have a great day.

## 2022-09-27 ENCOUNTER — LAB (OUTPATIENT)
Dept: INTERNAL MEDICINE | Facility: CLINIC | Age: 32
End: 2022-09-27

## 2022-09-27 DIAGNOSIS — Z79.899 ENCOUNTER FOR LONG-TERM (CURRENT) USE OF OTHER MEDICATIONS: Primary | ICD-10-CM

## 2022-09-28 LAB
CHOLEST SERPL-MCNC: 163 MG/DL (ref 0–200)
HDLC SERPL-MCNC: 36 MG/DL (ref 40–60)
LDLC SERPL CALC-MCNC: 107 MG/DL (ref 0–100)
TRIGL SERPL-MCNC: 107 MG/DL (ref 0–150)
TSH SERPL DL<=0.005 MIU/L-ACNC: 0.55 UIU/ML (ref 0.27–4.2)
VLDLC SERPL CALC-MCNC: 20 MG/DL (ref 5–40)

## 2022-10-03 NOTE — PROGRESS NOTES
Can discuss labs with him at his visit tomorrow on the fourth, however I am curious as to why these are the only labs that resulted when I ordered an entire panel.

## 2022-10-06 ENCOUNTER — OFFICE VISIT (OUTPATIENT)
Dept: INTERNAL MEDICINE | Facility: CLINIC | Age: 32
End: 2022-10-06

## 2022-10-06 VITALS
SYSTOLIC BLOOD PRESSURE: 124 MMHG | OXYGEN SATURATION: 99 % | HEIGHT: 70 IN | DIASTOLIC BLOOD PRESSURE: 72 MMHG | BODY MASS INDEX: 30.35 KG/M2 | WEIGHT: 212 LBS | TEMPERATURE: 97.3 F | HEART RATE: 104 BPM

## 2022-10-06 DIAGNOSIS — M21.961 ACQUIRED ANKLE/FOOT DEFORMITY, RIGHT: ICD-10-CM

## 2022-10-06 DIAGNOSIS — Z00.00 WELCOME TO MEDICARE PREVENTIVE VISIT: Primary | ICD-10-CM

## 2022-10-06 DIAGNOSIS — F43.10 PTSD (POST-TRAUMATIC STRESS DISORDER): ICD-10-CM

## 2022-10-06 PROCEDURE — 1159F MED LIST DOCD IN RCRD: CPT

## 2022-10-06 PROCEDURE — G0438 PPPS, INITIAL VISIT: HCPCS

## 2022-10-06 PROCEDURE — 1125F AMNT PAIN NOTED PAIN PRSNT: CPT

## 2022-10-06 PROCEDURE — 1170F FXNL STATUS ASSESSED: CPT
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
## 2022-10-17 ENCOUNTER — OFFICE VISIT (OUTPATIENT)
Dept: INTERNAL MEDICINE | Facility: CLINIC | Age: 32
End: 2022-10-17

## 2022-10-17 VITALS
HEIGHT: 70 IN | BODY MASS INDEX: 30.06 KG/M2 | WEIGHT: 210 LBS | SYSTOLIC BLOOD PRESSURE: 110 MMHG | HEART RATE: 83 BPM | OXYGEN SATURATION: 99 % | DIASTOLIC BLOOD PRESSURE: 72 MMHG | TEMPERATURE: 97.7 F

## 2022-10-17 DIAGNOSIS — R11.0 NAUSEA: ICD-10-CM

## 2022-10-17 DIAGNOSIS — K52.9 GASTROENTERITIS: Primary | ICD-10-CM

## 2022-10-17 DIAGNOSIS — R82.90 ABNORMAL URINE: ICD-10-CM

## 2022-10-17 PROCEDURE — 99213 OFFICE O/P EST LOW 20 MIN: CPT

## 2022-10-17 RX ORDER — ONDANSETRON 4 MG/1
4 TABLET, ORALLY DISINTEGRATING ORAL EVERY 8 HOURS PRN
Qty: 25 TABLET | Refills: 0 | Status: SHIPPED | OUTPATIENT
Start: 2022-10-17 | End: 2022-12-21
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
## 2022-10-19 LAB
BACTERIA UR CULT: NO GROWTH
BACTERIA UR CULT: NORMAL

## 2022-10-22 ENCOUNTER — OFFICE VISIT (OUTPATIENT)
Age: 32
End: 2022-10-22
Payer: MEDICARE

## 2022-10-22 VITALS
HEIGHT: 72 IN | BODY MASS INDEX: 28.58 KG/M2 | HEART RATE: 98 BPM | OXYGEN SATURATION: 95 % | TEMPERATURE: 99.8 F | SYSTOLIC BLOOD PRESSURE: 112 MMHG | WEIGHT: 211 LBS | DIASTOLIC BLOOD PRESSURE: 72 MMHG

## 2022-10-22 DIAGNOSIS — J02.9 PHARYNGITIS, UNSPECIFIED ETIOLOGY: Primary | ICD-10-CM

## 2022-10-22 PROCEDURE — G8484 FLU IMMUNIZE NO ADMIN: HCPCS | Performed by: NURSE PRACTITIONER

## 2022-10-22 PROCEDURE — G8419 CALC BMI OUT NRM PARAM NOF/U: HCPCS | Performed by: NURSE PRACTITIONER

## 2022-10-22 PROCEDURE — 99213 OFFICE O/P EST LOW 20 MIN: CPT | Performed by: NURSE PRACTITIONER

## 2022-10-22 PROCEDURE — G8427 DOCREV CUR MEDS BY ELIG CLIN: HCPCS | Performed by: NURSE PRACTITIONER

## 2022-10-22 PROCEDURE — 4004F PT TOBACCO SCREEN RCVD TLK: CPT | Performed by: NURSE PRACTITIONER

## 2022-10-22 RX ORDER — AMOXICILLIN 875 MG/1
875 TABLET, COATED ORAL 2 TIMES DAILY
Qty: 20 TABLET | Refills: 0 | Status: SHIPPED | OUTPATIENT
Start: 2022-10-22 | End: 2022-11-01

## 2022-10-22 ASSESSMENT — ENCOUNTER SYMPTOMS
EYE DISCHARGE: 0
TROUBLE SWALLOWING: 0
WHEEZING: 0
EYE PAIN: 0
COLOR CHANGE: 0
ABDOMINAL PAIN: 0
DIARRHEA: 1
SORE THROAT: 1
COUGH: 0
CHEST TIGHTNESS: 0
SINUS PRESSURE: 0
STRIDOR: 0
ABDOMINAL DISTENTION: 0
SHORTNESS OF BREATH: 0

## 2022-10-22 NOTE — LETTER
[facility]  [address] [place] [address] [address] [id number]  Phone: [phone]  Fax: [phone]    TAMIKO Christensen CNP        October 22, 2022     Patient: Dillan Sarah   YOB: 1990   Date of Visit: 10/22/2022       To Whom It May Concern:    Please excuse  Dillan Sarah from work. He may return on 10/24/22. If you have any questions or concerns, please don't hesitate to call.     Sincerely,        TAMIKO Christensen CNP

## 2022-10-22 NOTE — PROGRESS NOTES
[id number]  [address] [facility] [address] [id number]  Dept: [phone]  Dept Fax: [phone]  Loc: [phone]    Claudeen Castellani is a 27 y.o. male who presents today for his medical conditions/complaints as noted below. Claudeen Castellani is complaining of GI Problem, Diarrhea, and Pharyngitis        HPI:   GI Problem  The primary symptoms include diarrhea. Primary symptoms do not include fever, fatigue, abdominal pain, dysuria, arthralgias or rash. The illness does not include chills. Diarrhea   Pertinent negatives include no abdominal pain, arthralgias, chills, coughing or fever. Pharyngitis  Associated symptoms include a sore throat. Pertinent negatives include no abdominal pain, arthralgias, chest pain, chills, congestion, coughing, fatigue, fever, neck pain, numbness, rash or weakness. Wanda Perdomo presents to the office complaining of upset stomach, diarrhea, and sore throat that started on Friday. Patient states he was sent home from work on Friday and needs a note saying he is cleared to go back on Monday. Patient denies fever. Past Medical History:   Diagnosis Date    Anxiety     PTSD (post-traumatic stress disorder)        Past Surgical History:   Procedure Laterality Date    ANKLE SURGERY Right     ARM SURGERY Left     FOOT SURGERY Right     HIP SURGERY Right     WRIST SURGERY Left        No family history on file. Social History     Tobacco Use    Smoking status: Every Day     Packs/day: 0.50     Types: Cigarettes    Smokeless tobacco: Never   Substance Use Topics    Alcohol use: Not Currently        Current Outpatient Medications   Medication Sig Dispense Refill    amoxicillin (AMOXIL) 875 MG tablet Take 1 tablet by mouth 2 times daily for 10 days 20 tablet 0     No current facility-administered medications for this visit.        Allergies   Allergen Reactions    Nuts [Peanut-Containing Drug Products] Health Maintenance   Topic Date Due    Varicella vaccine (1 of 2 - 2-dose childhood series) Never done    Pneumococcal 0-64 years Vaccine (1 - PCV) Never done    Depression Screen  Never done    HIV screen  Never done    Hepatitis C screen  Never done    DTaP/Tdap/Td vaccine (1 - Tdap) Never done    Flu vaccine (1) Never done    COVID-19 Vaccine (3 - Booster for Moderna series) 10/21/2022    Hepatitis A vaccine  Aged Out    Hib vaccine  Aged Out    Meningococcal (ACWY) vaccine  Aged Out       Subjective:   Review of Systems   Constitutional:  Negative for chills, fatigue and fever. HENT:  Positive for sore throat. Negative for congestion, sinus pressure and trouble swallowing. Eyes:  Negative for pain and discharge. Respiratory:  Negative for cough, chest tightness, shortness of breath, wheezing and stridor. Cardiovascular:  Negative for chest pain and palpitations. Gastrointestinal:  Positive for diarrhea. Negative for abdominal distention and abdominal pain. Genitourinary:  Negative for difficulty urinating, dysuria and hematuria. Musculoskeletal:  Negative for arthralgias, neck pain and neck stiffness. Skin:  Negative for color change and rash. Neurological:  Negative for dizziness, syncope, speech difficulty, weakness and numbness. Psychiatric/Behavioral:  Negative for confusion and suicidal ideas. Objective    Physical Exam  Vitals and nursing note reviewed. Constitutional:       General: He is not in acute distress. Appearance: Normal appearance. HENT:      Head: Normocephalic. Right Ear: External ear normal.      Left Ear: External ear normal.      Nose: Nose normal. No congestion. Mouth/Throat:      Mouth: Mucous membranes are moist.      Pharynx: Oropharynx is clear. Posterior oropharyngeal erythema (moderate) present. Eyes:      Conjunctiva/sclera: Conjunctivae normal.      Pupils: Pupils are equal, round, and reactive to light.    Cardiovascular: Rate and Rhythm: Normal rate and regular rhythm. Pulses: Normal pulses. Heart sounds: Normal heart sounds. No murmur heard. Pulmonary:      Effort: Pulmonary effort is normal. No respiratory distress. Breath sounds: Normal breath sounds. No stridor. No wheezing. Abdominal:      General: Abdomen is flat. Bowel sounds are normal. There is no distension. Tenderness: There is no abdominal tenderness. Musculoskeletal:         General: No swelling or deformity. Normal range of motion. Cervical back: Normal range of motion. No rigidity or tenderness. Skin:     General: Skin is warm and dry. Findings: No rash. Neurological:      General: No focal deficit present. Mental Status: He is alert and oriented to person, place, and time. Sensory: No sensory deficit. /72   Pulse 98   Temp 99.8 °F (37.7 °C)   Ht 6' (1.829 m)   Wt 211 lb (95.7 kg)   SpO2 95%   BMI 28.62 kg/m²     Assessment         Diagnosis Orders   1. Pharyngitis, unspecified etiology  amoxicillin (AMOXIL) 875 MG tablet          Plan   Encourage fluids, Tylenol/Ibuprofen, OTC decongestants   Antibiotic sent to pharmacy, start probiotic for diarrhea. If symptoms worsen or fail to improve follow-up with office or PCP  If SOB, chest pain, or high persistent fevers occur, go to ER    Patient verbalized understanding and agrees to plan    No orders of the defined types were placed in this encounter. No results found for this visit on 10/22/22. Orders Placed This Encounter   Medications    amoxicillin (AMOXIL) 875 MG tablet     Sig: Take 1 tablet by mouth 2 times daily for 10 days     Dispense:  20 tablet     Refill:  0      New Prescriptions    AMOXICILLIN (AMOXIL) 875 MG TABLET    Take 1 tablet by mouth 2 times daily for 10 days        No follow-ups on file. Discussed use, benefits, and side effects of any prescribed medications. All patient questions were answered.  Patient voiced understanding of care plan. Patient was given educational materials - see patient instructions below. Patient Instructions   Encourage fluids, Tylenol/Ibuprofen, OTC decongestants   Antibiotic sent to pharmacy, start probiotic for diarrhea.   If symptoms worsen or fail to improve follow-up with office or PCP  If SOB, chest pain, or high persistent fevers occur, go to ER    Patient verbalized understanding and agrees to plan      Electronically signed by TAMIKO Johnson CNP on 10/22/2022 at 1:23 PM

## 2022-10-22 NOTE — PATIENT INSTRUCTIONS
Encourage fluids, Tylenol/Ibuprofen, OTC decongestants   Antibiotic sent to pharmacy, start probiotic for diarrhea.   If symptoms worsen or fail to improve follow-up with office or PCP  If SOB, chest pain, or high persistent fevers occur, go to ER    Patient verbalized understanding and agrees to plan

## 2022-12-21 ENCOUNTER — PRE-ADMISSION TESTING (OUTPATIENT)
Dept: PREADMISSION TESTING | Facility: HOSPITAL | Age: 32
End: 2022-12-21

## 2022-12-21 VITALS
HEART RATE: 68 BPM | RESPIRATION RATE: 20 BRPM | OXYGEN SATURATION: 98 % | WEIGHT: 212.96 LBS | SYSTOLIC BLOOD PRESSURE: 126 MMHG | HEIGHT: 72 IN | BODY MASS INDEX: 28.85 KG/M2 | DIASTOLIC BLOOD PRESSURE: 80 MMHG

## 2022-12-21 LAB
ANION GAP SERPL CALCULATED.3IONS-SCNC: 9 MMOL/L (ref 5–15)
BUN SERPL-MCNC: 10 MG/DL (ref 6–20)
BUN/CREAT SERPL: 10.6 (ref 7–25)
CALCIUM SPEC-SCNC: 9.7 MG/DL (ref 8.6–10.5)
CHLORIDE SERPL-SCNC: 100 MMOL/L (ref 98–107)
CO2 SERPL-SCNC: 30 MMOL/L (ref 22–29)
CREAT SERPL-MCNC: 0.94 MG/DL (ref 0.76–1.27)
DEPRECATED RDW RBC AUTO: 50.2 FL (ref 37–54)
EGFRCR SERPLBLD CKD-EPI 2021: 109.1 ML/MIN/1.73
ERYTHROCYTE [DISTWIDTH] IN BLOOD BY AUTOMATED COUNT: 13.9 % (ref 12.3–15.4)
GLUCOSE SERPL-MCNC: 99 MG/DL (ref 65–99)
HCT VFR BLD AUTO: 55.4 % (ref 37.5–51)
HGB BLD-MCNC: 17.6 G/DL (ref 13–17.7)
MCH RBC QN AUTO: 31.1 PG (ref 26.6–33)
MCHC RBC AUTO-ENTMCNC: 31.8 G/DL (ref 31.5–35.7)
MCV RBC AUTO: 97.9 FL (ref 79–97)
PLATELET # BLD AUTO: 319 10*3/MM3 (ref 140–450)
PMV BLD AUTO: 9.1 FL (ref 6–12)
POTASSIUM SERPL-SCNC: 4.2 MMOL/L (ref 3.5–5.2)
RBC # BLD AUTO: 5.66 10*6/MM3 (ref 4.14–5.8)
SODIUM SERPL-SCNC: 139 MMOL/L (ref 136–145)
WBC NRBC COR # BLD: 7.93 10*3/MM3 (ref 3.4–10.8)

## 2022-12-21 PROCEDURE — 36415 COLL VENOUS BLD VENIPUNCTURE: CPT

## 2022-12-21 PROCEDURE — 80048 BASIC METABOLIC PNL TOTAL CA: CPT

## 2022-12-21 PROCEDURE — 85027 COMPLETE CBC AUTOMATED: CPT

## 2022-12-21 NOTE — DISCHARGE INSTRUCTIONS
Before you come to the hospital        Arrival time: AS DIRECTED BY OFFICE     YOU MAY TAKE THE FOLLOWING MEDICATION(S) THE MORNING OF SURGERY WITH A SIP OF WATER: none           ALL OTHER HOME MEDICATION CHECK WITH YOUR PHYSICIAN (especially if   you are taking diabetes medicines or blood thinners)    Do not take any Erectile Dysfunction medications (EX: CIALIS, VIAGRA) 24 hours prior to surgery.      If you were given and instructed to use a germ- killing soap, use as directed the night before surgery and again the morning of surgery or as directed by your surgeon. (Use one-half of the bottle with each shower.)   See attached information for How to Use Chlorhexidine for Bathing if applicable.            Eating and drinking restrictions prior to scheduled arrival time    2 Hours before arrival time STOP   Drinking Clear liquids (water, apple juice-no pulp)     6 Hours before arrival time STOP   Milk or drinks that contain milk, full liquids    6 Hours before arrival time STOP   Light meals or foods, such as toast or cereal    8 Hours before arrival time STOP   Heavy foods, such as meat, fried foods, or fatty foods    (It is extremely important that you follow these guidelines to prevent delay or cancelation of your procedure)     Clear Liquids  Water and flavored water                                                                      Clear Fruit juices, such as cranberry juice and apple juice.  Black coffee (NO cream of any kind, including powdered).  Plain tea  Clear bouillon or broth.  Flavored gelatin.  Soda.  Gatorade or Powerade.  Full liquid examples  Juices that have pulp.  Frozen ice pops that contain fruit pieces.  Coffee with creamer  Milk.  Yogurt.                MANAGING PAIN AFTER SURGERY    We know you are probably wondering what your pain will be like after surgery.  Following surgery it is unrealistic to expect you will not have pain.   Pain is how our bodies let us know that something is wrong  or cautions us to be careful.  That said, our goal is to make your pain tolerable.    Methods we may use to treat your pain include (oral or IV medications, PCAs, epidurals, nerve blocks, etc.)   While some procedures require IV pain medications for a short time after surgery, transitioning to pain medications by mouth allows for better management of pain.   Your nurse will encourage you to take oral pain medications whenever possible.  IV medications work almost immediately, but only last a short while.  Taking medications by mouth allows for a more constant level of medication in your blood stream for a longer period of time.      Once your pain is out of control it is harder to get back under control.  It is important you are aware when your next dose of pain medication is due.  If you are admitted, your nurse may write the time of your next dose on the white board in your room to help you remember.      We are interested in your pain and encourage you to inform us about aggravating factors during your visit.   Many times a simple repositioning every few hours can make a big difference.    If your physician says it is okay, do not let your pain prevent you from getting out of bed. Be sure to call your nurse for assistance prior to getting up so you do not fall.      Before surgery, please decide your tolerable pain goal.  These faces help describe the pain ratings we use on a 0-10 scale.   Be prepared to tell us your goal and whether or not you take pain or anxiety medications at home.          Preparing for Surgery  Preparing for surgery is an important part of your care. It can make things go more smoothly and help you avoid complications. The steps leading up to surgery may vary among hospitals. Follow all instructions given to you by your health care providers. Ask questions if you do not understand something. Talk about any concerns that you have.  Here are some questions to consider asking before your  surgery:  If my surgery is not an emergency (is elective), when would be the best time to have the surgery?  What arrangements do I need to make for work, home, or school?  What will my recovery be like? How long will it be before I can return to normal activities?  Will I need to prepare my home? Will I need to arrange care for me or my children?  Should I expect to have pain after surgery? What are my pain management options? Are there nonmedical options that I can try for pain?  Tell a health care provider about:  Any allergies you have.  All medicines you are taking, including vitamins, herbs, eye drops, creams, and over-the-counter medicines.  Any problems you or family members have had with anesthetic medicines.  Any blood disorders you have.  Any surgeries you have had.  Any medical conditions you have.  Whether you are pregnant or may be pregnant.  What are the risks?  The risks and complications of surgery depend on the specific procedure that you have. Discuss all the risks with your health care providers before your surgery. Ask about common surgical complications, which may include:  Infection.  Bleeding or a need for blood replacement (transfusion).  Allergic reactions to medicines.  Damage to surrounding nerves, tissues, or structures.  A blood clot.  Scarring.  Failure of the surgery to correct the problem.  Follow these instructions before the procedure:  Several days or weeks before your procedure  You may have a physical exam by your primary health care provider to make sure it is safe for you to have surgery.  You may have testing. This may include a chest X-ray, blood and urine tests, electrocardiogram (ECG), or other testing.  Ask your health care provider about:  Changing or stopping your regular medicines. This is especially important if you are taking diabetes medicines or blood thinners.  Taking medicines such as aspirin and ibuprofen. These medicines can thin your blood. Do not take these  medicines unless your health care provider tells you to take them.  Taking over-the-counter medicines, vitamins, herbs, and supplements.  Do not use any products that contain nicotine or tobacco, such as cigarettes and e-cigarettes. If you need help quitting, ask your health care provider.  Avoid alcohol.  Ask your health care provider if there are exercises you can do to prepare for surgery.  Eat a healthy diet.   Plan to have someone take you home from the hospital or clinic.  Plan to have a responsible adult care for you for at least 24 hours after you leave the hospital or clinic. This is important.  The day before your procedure  You may be given antibiotic medicine to take by mouth to help prevent infection. Take it as told by your health care provider.  You may be asked to shower with a germ-killing soap.  Follow instructions from your health care provider about eating and drinking restrictions. This includes gum, mints and hard candy.  Pack comfortable clothes according to your procedure.   The day of your procedure  You may need to take another shower with a germ-killing soap before you leave home in the morning.  With a small sip of water, take only the medicines that you are told to take.  Remove all jewelry including rings.   Leave anything you consider valuable at home except hearing aids if needed.  You do not need to bring your home medications into the hospital.   Do not wear any makeup, nail polish, powder, deodorant, lotion, hair accessories, or anything on your skin or body except your clothes.  If you will be staying in the hospital, bring a case to hold your glasses, contacts, or dentures. You may also want to bring your robe and non-skid footwear.       (Do not use denture adhesives since you will be asked to remove them during  surgery).   If you wear oxygen at home, bring it with you the day of surgery.  If instructed by your health care provider, bring your sleep apnea device with you on the  day of your surgery (if this applies to you).  You may want to leave your suitcase and sleep apnea device in the car until after surgery.   Arrive at the hospital as scheduled.  Bring a friend or family member with you who can help to answer questions and be present while you meet with your health care provider.  At the hospital  When you arrive at the hospital:  Go to registration located at the main entrance of the hospital. You will be registered and given a beeper and a sticker sheet. Take the stickers to the Outpatient nurses desk and place in the black tray. This is to notify staff that you have arrived. Then return to the lobby to wait.   When your beeper lights up and vibrates proceed through the double doors, under the stairs, and a member of the Outpatient Surgery staff will escort you to your preoperative room.  You may have to wear compression sleeves. These help to prevent blood clots and reduce swelling in your legs.  An IV may be inserted into one of your veins.              In the operating room, you may be given one or more of the following:        A medicine to help you relax (sedative).        A medicine to numb the area (local anesthetic).        A medicine to make you fall asleep (general anesthetic).        A medicine that is injected into an area of your body to numb everything below the                      injection site (regional anesthetic).  You may be given an antibiotic through your IV to help prevent infection.  Your surgical site will be marked or identified.    Contact a health care provider if you:  Develop a fever of more than 100.4°F (38°C) or other feelings of illness during the 48 hours before your surgery.  Have symptoms that get worse.  Have questions or concerns about your surgery.  Summary  Preparing for surgery can make the procedure go more smoothly and lower your risk of complications.  Before surgery, make a list of questions and concerns to discuss with your surgeon.  Ask about the risks and possible complications.  In the days or weeks before your surgery, follow all instructions from your health care provider. You may need to stop smoking, avoid alcohol, follow eating restrictions, and change or stop your regular medicines.  Contact your surgeon if you develop a fever or other signs of illness during the few days before your surgery.  This information is not intended to replace advice given to you by your health care provider. Make sure you discuss any questions you have with your health care provider.  Document Revised: 12/21/2018 Document Reviewed: 10/23/2018  Elsevier Patient Education © 2021 [employer]

## 2023-01-03 ENCOUNTER — ANESTHESIA (OUTPATIENT)
Dept: PERIOP | Facility: HOSPITAL | Age: 33
End: 2023-01-03
Payer: MEDICARE

## 2023-01-03 ENCOUNTER — HOSPITAL ENCOUNTER (OUTPATIENT)
Facility: HOSPITAL | Age: 33
Setting detail: HOSPITAL OUTPATIENT SURGERY
Discharge: HOME | End: 2023-01-03
Attending: PODIATRIST | Admitting: PODIATRIST
Payer: MEDICARE

## 2023-01-03 ENCOUNTER — ANESTHESIA EVENT (OUTPATIENT)
Dept: PERIOP | Facility: HOSPITAL | Age: 33
End: 2023-01-03
Payer: MEDICARE

## 2023-01-03 ENCOUNTER — APPOINTMENT (OUTPATIENT)
Dept: GENERAL RADIOLOGY | Facility: HOSPITAL | Age: 33
End: 2023-01-03
Payer: MEDICARE

## 2023-01-03 VITALS
TEMPERATURE: 98 F | OXYGEN SATURATION: 98 % | HEART RATE: 62 BPM | RESPIRATION RATE: 16 BRPM | DIASTOLIC BLOOD PRESSURE: 74 MMHG | SYSTOLIC BLOOD PRESSURE: 123 MMHG

## 2023-01-03 DIAGNOSIS — M19.171 POST-TRAUMATIC ARTHRITIS OF RIGHT FOOT: Primary | ICD-10-CM

## 2023-01-03 PROCEDURE — C1713 ANCHOR/SCREW BN/BN,TIS/BN: HCPCS | Performed by: PODIATRIST

## 2023-01-03 PROCEDURE — 25010000002 ONDANSETRON PER 1 MG: Performed by: NURSE ANESTHETIST, CERTIFIED REGISTERED

## 2023-01-03 PROCEDURE — 25010000002 ROPIVACAINE PER 1 MG: Performed by: ANESTHESIOLOGY

## 2023-01-03 PROCEDURE — 76000 FLUOROSCOPY <1 HR PHYS/QHP: CPT

## 2023-01-03 PROCEDURE — 25010000002 KETOROLAC TROMETHAMINE PER 15 MG: Performed by: NURSE ANESTHETIST, CERTIFIED REGISTERED

## 2023-01-03 PROCEDURE — 25010000002 DEXAMETHASONE PER 1 MG: Performed by: NURSE ANESTHETIST, CERTIFIED REGISTERED

## 2023-01-03 PROCEDURE — 25010000002 MIDAZOLAM PER 1 MG: Performed by: ANESTHESIOLOGY

## 2023-01-03 PROCEDURE — 25010000002 FENTANYL CITRATE (PF) 50 MCG/ML SOLUTION: Performed by: ANESTHESIOLOGY

## 2023-01-03 PROCEDURE — 73620 X-RAY EXAM OF FOOT: CPT

## 2023-01-03 PROCEDURE — 25010000002 DROPERIDOL PER 5 MG: Performed by: ANESTHESIOLOGY

## 2023-01-03 PROCEDURE — 25010000002 PROPOFOL 10 MG/ML EMULSION: Performed by: NURSE ANESTHETIST, CERTIFIED REGISTERED

## 2023-01-03 PROCEDURE — 25010000002 CEFAZOLIN PER 500 MG: Performed by: PODIATRIST

## 2023-01-03 DEVICE — KWIRE SMOTH DBL/TROC .062X9IN: Type: IMPLANTABLE DEVICE | Site: FOOT | Status: FUNCTIONAL

## 2023-01-03 DEVICE — 3.5 X 32 MM R3CON LOCKING PLATE SCREW
Type: IMPLANTABLE DEVICE | Site: FOOT | Status: FUNCTIONAL
Brand: GORILLA PLATING SYSTEM

## 2023-01-03 DEVICE — 3.5 X 24 MM R3CON NON-LOCKING PLATE SCREW
Type: IMPLANTABLE DEVICE | Site: FOOT | Status: FUNCTIONAL
Brand: GORILLA PLATING SYSTEM

## 2023-01-03 DEVICE — 3.5 X 22 MM R3CON LOCKING PLATE SCREW
Type: IMPLANTABLE DEVICE | Site: FOOT | Status: FUNCTIONAL
Brand: GORILLA PLATING SYSTEM

## 2023-01-03 DEVICE — 3.5 X 20 MM R3CON LOCKING PLATE SCREW
Type: IMPLANTABLE DEVICE | Site: FOOT | Status: FUNCTIONAL
Brand: GORILLA PLATING SYSTEM

## 2023-01-03 DEVICE — 7.0 X 74 MM HEADLESS, CANNULATED, SHORT THREAD SCREW
Type: IMPLANTABLE DEVICE | Site: FOOT | Status: FUNCTIONAL
Brand: MONSTER SCREW SYSTEM

## 2023-01-03 DEVICE — 7.0 X 55 MM HEADLESS, CANNULATED, SHORT THREAD SCREW
Type: IMPLANTABLE DEVICE | Site: FOOT | Status: FUNCTIONAL
Brand: MONSTER SCREW SYSTEM

## 2023-01-03 DEVICE — 25 MM DOGBONE PLATE W/ COMPRESSION
Type: IMPLANTABLE DEVICE | Site: FOOT | Status: FUNCTIONAL
Brand: GORILLA PLATING SYSTEM

## 2023-01-03 RX ORDER — SODIUM CHLORIDE 0.9 % (FLUSH) 0.9 %
10 SYRINGE (ML) INJECTION AS NEEDED
Status: DISCONTINUED | OUTPATIENT
Start: 2023-01-03 | End: 2023-01-03 | Stop reason: HOSPADM

## 2023-01-03 RX ORDER — FENTANYL CITRATE 50 UG/ML
25 INJECTION, SOLUTION INTRAMUSCULAR; INTRAVENOUS
Status: DISCONTINUED | OUTPATIENT
Start: 2023-01-03 | End: 2023-01-03 | Stop reason: HOSPADM

## 2023-01-03 RX ORDER — KETOROLAC TROMETHAMINE 30 MG/ML
INJECTION, SOLUTION INTRAMUSCULAR; INTRAVENOUS AS NEEDED
Status: DISCONTINUED | OUTPATIENT
Start: 2023-01-03 | End: 2023-01-03 | Stop reason: SURG

## 2023-01-03 RX ORDER — NALOXONE HCL 0.4 MG/ML
0.4 VIAL (ML) INJECTION AS NEEDED
Status: DISCONTINUED | OUTPATIENT
Start: 2023-01-03 | End: 2023-01-03 | Stop reason: HOSPADM

## 2023-01-03 RX ORDER — ONDANSETRON 2 MG/ML
4 INJECTION INTRAMUSCULAR; INTRAVENOUS ONCE AS NEEDED
Status: DISCONTINUED | OUTPATIENT
Start: 2023-01-03 | End: 2023-01-03 | Stop reason: HOSPADM

## 2023-01-03 RX ORDER — MAGNESIUM HYDROXIDE 1200 MG/15ML
LIQUID ORAL AS NEEDED
Status: DISCONTINUED | OUTPATIENT
Start: 2023-01-03 | End: 2023-01-03 | Stop reason: HOSPADM

## 2023-01-03 RX ORDER — ROPIVACAINE HYDROCHLORIDE 5 MG/ML
INJECTION, SOLUTION EPIDURAL; INFILTRATION; PERINEURAL
Status: COMPLETED | OUTPATIENT
Start: 2023-01-03 | End: 2023-01-03

## 2023-01-03 RX ORDER — SCOLOPAMINE TRANSDERMAL SYSTEM 1 MG/1
1 PATCH, EXTENDED RELEASE TRANSDERMAL ONCE
Status: DISCONTINUED | OUTPATIENT
Start: 2023-01-03 | End: 2023-01-03 | Stop reason: HOSPADM

## 2023-01-03 RX ORDER — FENTANYL CITRATE 50 UG/ML
100 INJECTION, SOLUTION INTRAMUSCULAR; INTRAVENOUS ONCE
Status: COMPLETED | OUTPATIENT
Start: 2023-01-03 | End: 2023-01-03

## 2023-01-03 RX ORDER — ACETAMINOPHEN 500 MG
1000 TABLET ORAL ONCE
Status: COMPLETED | OUTPATIENT
Start: 2023-01-03 | End: 2023-01-03

## 2023-01-03 RX ORDER — SODIUM CHLORIDE, SODIUM LACTATE, POTASSIUM CHLORIDE, CALCIUM CHLORIDE 600; 310; 30; 20 MG/100ML; MG/100ML; MG/100ML; MG/100ML
100 INJECTION, SOLUTION INTRAVENOUS CONTINUOUS
Status: DISCONTINUED | OUTPATIENT
Start: 2023-01-03 | End: 2023-01-03 | Stop reason: HOSPADM

## 2023-01-03 RX ORDER — LABETALOL HYDROCHLORIDE 5 MG/ML
5 INJECTION, SOLUTION INTRAVENOUS
Status: DISCONTINUED | OUTPATIENT
Start: 2023-01-03 | End: 2023-01-03 | Stop reason: HOSPADM

## 2023-01-03 RX ORDER — OXYCODONE AND ACETAMINOPHEN 7.5; 325 MG/1; MG/1
2 TABLET ORAL EVERY 4 HOURS PRN
Status: DISCONTINUED | OUTPATIENT
Start: 2023-01-03 | End: 2023-01-03 | Stop reason: HOSPADM

## 2023-01-03 RX ORDER — IBUPROFEN 600 MG/1
600 TABLET ORAL ONCE AS NEEDED
Status: DISCONTINUED | OUTPATIENT
Start: 2023-01-03 | End: 2023-01-03 | Stop reason: HOSPADM

## 2023-01-03 RX ORDER — SODIUM CHLORIDE 0.9 % (FLUSH) 0.9 %
3 SYRINGE (ML) INJECTION EVERY 12 HOURS SCHEDULED
Status: DISCONTINUED | OUTPATIENT
Start: 2023-01-03 | End: 2023-01-03 | Stop reason: HOSPADM

## 2023-01-03 RX ORDER — FLUMAZENIL 0.1 MG/ML
0.2 INJECTION INTRAVENOUS AS NEEDED
Status: DISCONTINUED | OUTPATIENT
Start: 2023-01-03 | End: 2023-01-03 | Stop reason: HOSPADM

## 2023-01-03 RX ORDER — DROPERIDOL 2.5 MG/ML
0.62 INJECTION, SOLUTION INTRAMUSCULAR; INTRAVENOUS ONCE AS NEEDED
Status: DISCONTINUED | OUTPATIENT
Start: 2023-01-03 | End: 2023-01-03 | Stop reason: HOSPADM

## 2023-01-03 RX ORDER — PROMETHAZINE HYDROCHLORIDE 12.5 MG/1
12.5 TABLET ORAL EVERY 4 HOURS PRN
Qty: 42 TABLET | Refills: 0 | Status: SHIPPED | OUTPATIENT
Start: 2023-01-03

## 2023-01-03 RX ORDER — PROPOFOL 10 MG/ML
VIAL (ML) INTRAVENOUS AS NEEDED
Status: DISCONTINUED | OUTPATIENT
Start: 2023-01-03 | End: 2023-01-03 | Stop reason: SURG

## 2023-01-03 RX ORDER — LIDOCAINE HYDROCHLORIDE 10 MG/ML
0.5 INJECTION, SOLUTION EPIDURAL; INFILTRATION; INTRACAUDAL; PERINEURAL ONCE AS NEEDED
Status: DISCONTINUED | OUTPATIENT
Start: 2023-01-03 | End: 2023-01-03 | Stop reason: HOSPADM

## 2023-01-03 RX ORDER — SODIUM CHLORIDE, SODIUM LACTATE, POTASSIUM CHLORIDE, CALCIUM CHLORIDE 600; 310; 30; 20 MG/100ML; MG/100ML; MG/100ML; MG/100ML
100 INJECTION, SOLUTION INTRAVENOUS CONTINUOUS PRN
Status: DISCONTINUED | OUTPATIENT
Start: 2023-01-03 | End: 2023-01-03 | Stop reason: HOSPADM

## 2023-01-03 RX ORDER — SODIUM CHLORIDE 0.9 % (FLUSH) 0.9 %
3-10 SYRINGE (ML) INJECTION AS NEEDED
Status: DISCONTINUED | OUTPATIENT
Start: 2023-01-03 | End: 2023-01-03 | Stop reason: HOSPADM

## 2023-01-03 RX ORDER — MIDAZOLAM HYDROCHLORIDE 1 MG/ML
1 INJECTION INTRAMUSCULAR; INTRAVENOUS
Status: DISCONTINUED | OUTPATIENT
Start: 2023-01-03 | End: 2023-01-03 | Stop reason: HOSPADM

## 2023-01-03 RX ORDER — DEXAMETHASONE SODIUM PHOSPHATE 4 MG/ML
INJECTION, SOLUTION INTRA-ARTICULAR; INTRALESIONAL; INTRAMUSCULAR; INTRAVENOUS; SOFT TISSUE AS NEEDED
Status: DISCONTINUED | OUTPATIENT
Start: 2023-01-03 | End: 2023-01-03 | Stop reason: SURG

## 2023-01-03 RX ORDER — OXYCODONE AND ACETAMINOPHEN 10; 325 MG/1; MG/1
1 TABLET ORAL EVERY 4 HOURS PRN
Qty: 42 TABLET | Refills: 0 | Status: SHIPPED | OUTPATIENT
Start: 2023-01-03

## 2023-01-03 RX ORDER — MIDAZOLAM HYDROCHLORIDE 1 MG/ML
1 INJECTION INTRAMUSCULAR; INTRAVENOUS
Status: DISCONTINUED | OUTPATIENT
Start: 2023-01-03 | End: 2023-01-03 | Stop reason: SDUPTHER

## 2023-01-03 RX ORDER — SODIUM CHLORIDE 9 MG/ML
40 INJECTION, SOLUTION INTRAVENOUS AS NEEDED
Status: DISCONTINUED | OUTPATIENT
Start: 2023-01-03 | End: 2023-01-03 | Stop reason: HOSPADM

## 2023-01-03 RX ORDER — ONDANSETRON 2 MG/ML
INJECTION INTRAMUSCULAR; INTRAVENOUS AS NEEDED
Status: DISCONTINUED | OUTPATIENT
Start: 2023-01-03 | End: 2023-01-03 | Stop reason: SURG

## 2023-01-03 RX ORDER — DROPERIDOL 2.5 MG/ML
0.62 INJECTION, SOLUTION INTRAMUSCULAR; INTRAVENOUS ONCE AS NEEDED
Status: COMPLETED | OUTPATIENT
Start: 2023-01-03 | End: 2023-01-03

## 2023-01-03 RX ORDER — SODIUM CHLORIDE 0.9 % (FLUSH) 0.9 %
10 SYRINGE (ML) INJECTION EVERY 12 HOURS SCHEDULED
Status: DISCONTINUED | OUTPATIENT
Start: 2023-01-03 | End: 2023-01-03 | Stop reason: HOSPADM

## 2023-01-03 RX ORDER — BUPIVACAINE HCL/0.9 % NACL/PF 0.1 %
2 PLASTIC BAG, INJECTION (ML) EPIDURAL ONCE
Status: COMPLETED | OUTPATIENT
Start: 2023-01-03 | End: 2023-01-03

## 2023-01-03 RX ORDER — MIDAZOLAM HYDROCHLORIDE 1 MG/ML
2 INJECTION INTRAMUSCULAR; INTRAVENOUS ONCE
Status: COMPLETED | OUTPATIENT
Start: 2023-01-03 | End: 2023-01-03

## 2023-01-03 RX ORDER — OXYCODONE AND ACETAMINOPHEN 10; 325 MG/1; MG/1
1 TABLET ORAL ONCE AS NEEDED
Status: DISCONTINUED | OUTPATIENT
Start: 2023-01-03 | End: 2023-01-03 | Stop reason: HOSPADM

## 2023-01-03 RX ADMIN — DEXAMETHASONE SODIUM PHOSPHATE 8 MG: 4 INJECTION, SOLUTION INTRA-ARTICULAR; INTRALESIONAL; INTRAMUSCULAR; INTRAVENOUS; SOFT TISSUE at 11:01

## 2023-01-03 RX ADMIN — SCOPALAMINE 1 PATCH: 1 PATCH, EXTENDED RELEASE TRANSDERMAL at 10:36

## 2023-01-03 RX ADMIN — SODIUM CHLORIDE, POTASSIUM CHLORIDE, SODIUM LACTATE AND CALCIUM CHLORIDE 100 ML/HR: 600; 310; 30; 20 INJECTION, SOLUTION INTRAVENOUS at 10:13

## 2023-01-03 RX ADMIN — ACETAMINOPHEN 1000 MG: 500 TABLET ORAL at 10:36

## 2023-01-03 RX ADMIN — LIDOCAINE HYDROCHLORIDE 100 MG: 20 INJECTION, SOLUTION INTRAVENOUS at 10:58

## 2023-01-03 RX ADMIN — DROPERIDOL 0.62 MG: 2.5 INJECTION, SOLUTION INTRAMUSCULAR; INTRAVENOUS at 10:36

## 2023-01-03 RX ADMIN — ROPIVACAINE HYDROCHLORIDE 20 ML: 5 INJECTION, SOLUTION EPIDURAL; INFILTRATION; PERINEURAL at 10:47

## 2023-01-03 RX ADMIN — Medication 2 G: at 11:02

## 2023-01-03 RX ADMIN — ROPIVACAINE HYDROCHLORIDE 20 ML: 5 INJECTION, SOLUTION EPIDURAL; INFILTRATION; PERINEURAL at 10:49

## 2023-01-03 RX ADMIN — PROPOFOL 200 MG: 10 INJECTION, EMULSION INTRAVENOUS at 10:58

## 2023-01-03 RX ADMIN — ONDANSETRON 4 MG: 2 INJECTION INTRAMUSCULAR; INTRAVENOUS at 13:06

## 2023-01-03 RX ADMIN — MIDAZOLAM HYDROCHLORIDE 2 MG: 2 INJECTION, SOLUTION INTRAMUSCULAR; INTRAVENOUS at 10:36

## 2023-01-03 RX ADMIN — KETOROLAC TROMETHAMINE 15 MG: 30 INJECTION, SOLUTION INTRAMUSCULAR; INTRAVENOUS at 13:07

## 2023-01-03 RX ADMIN — FENTANYL CITRATE 100 MCG: 50 INJECTION INTRAMUSCULAR; INTRAVENOUS at 10:46

## 2023-01-03 NOTE — ANESTHESIA PROCEDURE NOTES
Airway  Urgency: elective    Date/Time: 1/3/2023 10:59 AM  Airway not difficult    General Information and Staff    Patient location during procedure: OR  CRNA/CAA: Kelly Harper CRNA    Indications and Patient Condition  Indications for airway management: airway protection    Preoxygenated: yes  Mask difficulty assessment: 1 - vent by mask    Final Airway Details  Final airway type: supraglottic airway      Successful airway: I-gel  Size 4     Number of attempts at approach: 1  Assessment: lips, teeth, and gum same as pre-op

## 2023-01-03 NOTE — ANESTHESIA PROCEDURE NOTES
Peripheral Block    Pre-sedation assessment completed: 1/3/2023 10:46 AM    Patient reassessed immediately prior to procedure    Patient location during procedure: holding area  Start time: 1/3/2023 10:49 AM  Stop time: 1/3/2023 10:50 AM  Reason for block: procedure for pain, at surgeon's request, post-op pain management and Requested by Dr. He  Performed by  Anesthesiologist: Lucius Snell MD  Preanesthetic Checklist  Completed: patient identified, IV checked, site marked, risks and benefits discussed, surgical consent, monitors and equipment checked, pre-op evaluation and timeout performed  Prep:  Pt Position: supine  Sterile barriers:mask, gloves and washed/disinfected hands  Prep: ChloraPrep  Patient monitoring: blood pressure monitoring, continuous pulse oximetry and EKG  Procedure    Sedation: yes  Performed under: MAC  Guidance:ultrasound guided and femoral artery identified in adductor canal and local anesthetic seen surrounding artery    ULTRASOUND INTERPRETATION.  Using ultrasound guidance a 20 G gauge needle was placed in close proximity to the femoral nerve, at which point, under ultrasound guidance anesthetic was injected in the area of the nerve and spread of the anesthesia was seen on ultrasound in close proximity thereto.  There were no abnormalities seen on ultrasound; a digital image was taken; and the patient tolerated the procedure with no complications. Images:still images obtained (picture printed and placed in patients chart)    Laterality:right  Block Type:adductor canal block  Injection Technique:single-shot  Needle Type:echogenic      Medications Used: ropivacaine (NAROPIN) injection 0.5 % - Injection   20 mL - 1/3/2023 10:49:00 AM      Post Assessment  Injection Assessment: negative aspiration for heme, no paresthesia on injection and incremental injection  Patient Tolerance:comfortable throughout block  Complications:no

## 2023-01-03 NOTE — ANESTHESIA POSTPROCEDURE EVALUATION
Patient: Alex Gary    Procedure Summary     Date: 01/03/23 Room / Location:  [facility] OR [address] [place] [address] [facility] OR    Anesthesia Start: 1056 Anesthesia Stop: 1318    Procedures:       REPAIR OF MALUNION, REVISION SUBTALAR ARTHRODESIS WITH CONVERSION TO TRIP ARTHRODESIS, HAMMER TOE REPAIR AND PINNING OF SECOND, THIRD, FOURTH DIGITS, REMOVAL OF HARDWARE - RIGHT FOOT (Right: Foot)      HAMMER TOE REPAIR AND PINNING OF SECOND, THIRD, FOURTH DIGITS (Right: Toes)      REMOVAL OF HARDWARE - RIGHT FOOT (Right: Ankle) Diagnosis:       Malunion of osteotomy site      Retained orthopedic hardware      Post-traumatic arthritis of right foot      Hammertoe of right foot      Pain, foot      (Malunion subtalar joint right, retained orthopedic hardware right, posttraumatic arthritis calcaneocuboid joint right, hammertoe deformities second third and fourth digits right)    Surgeons: Sergei He DPM Provider: Kelly Harper CRNA    Anesthesia Type: general ASA Status: 2          Anesthesia Type: general    Vitals  Vitals Value Taken Time   /65 01/03/23 1432   Temp 98 °F (36.7 °C) 01/03/23 1415   Pulse 65 01/03/23 1432   Resp 14 01/03/23 1429   SpO2 94 % 01/03/23 1432   Vitals shown include unvalidated device data.        Post Anesthesia Care and Evaluation    Patient location during evaluation: PACU  Patient participation: complete - patient participated  Level of consciousness: awake and alert  Pain management: adequate    Airway patency: patent  Anesthetic complications: No anesthetic complications    Cardiovascular status: acceptable  Respiratory status: acceptable  Hydration status: acceptable    Comments: Blood pressure 123/74, pulse 62, temperature 98 °F (36.7 °C), temperature source Temporal, resp. rate 16, SpO2 98 %.    Pt discharged from PACU based on juan score >8
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
## 2023-01-03 NOTE — ANESTHESIA PREPROCEDURE EVALUATION
Anesthesia Evaluation     Patient summary reviewed   no history of anesthetic complications:  NPO Solid Status: > 6 hours             Airway   Mallampati: II  TM distance: >3 FB  Dental - normal exam     Pulmonary    (+) a smoker Current Abstained day of surgery, sleep apnea,   Cardiovascular - negative cardio ROS        Neuro/Psych  (-) seizures, CVA  GI/Hepatic/Renal/Endo    (-) no renal disease, diabetes    Musculoskeletal     Abdominal    Substance History   (+) drug use     OB/GYN          Other                      Anesthesia Plan    ASA 2     general     intravenous induction     Anesthetic plan, risks, benefits, and alternatives have been provided, discussed and informed consent has been obtained with: patient.        CODE STATUS:

## 2023-01-03 NOTE — ANESTHESIA PROCEDURE NOTES
Peripheral Block    Pre-sedation assessment completed: 1/3/2023 10:46 AM    Patient reassessed immediately prior to procedure    Patient location during procedure: holding area  Start time: 1/3/2023 10:47 AM  Stop time: 1/3/2023 10:48 AM  Reason for block: procedure for pain, at surgeon's request, post-op pain management and Requested by Dr. He  Performed by  Anesthesiologist: Lucius Snell MD  Preanesthetic Checklist  Completed: patient identified, IV checked, site marked, risks and benefits discussed, surgical consent, monitors and equipment checked, pre-op evaluation and timeout performed  Prep:  Pt Position: supine  Sterile barriers:mask, gloves, cap and washed/disinfected hands  Prep: ChloraPrep  Patient monitoring: blood pressure monitoring, continuous pulse oximetry and EKG  Procedure    Sedation: yes  Performed under: MAC  Guidance:ultrasound guided, nerve stimulator and Sciatic nerve identified and local anesthetic seen surrounding nerve    ULTRASOUND INTERPRETATION.  Using ultrasound guidance a 20 G gauge needle was placed in close proximity to the sciatic nerve, at which point, under ultrasound guidance anesthetic was injected in the area of the nerve and spread of the anesthesia was seen on ultrasound in close proximity thereto.  There were no abnormalities seen on ultrasound; a digital image was taken; and the patient tolerated the procedure with no complications. Images:still images obtained (picture printed and placed in patients chart)    Laterality:right  Block Type:sciatic and popliteal  Injection Technique:single-shot  Needle Type:echogenic      Medications Used: ropivacaine (NAROPIN) injection 0.5 % - Injection   20 mL - 1/3/2023 10:47:00 AM      Post Assessment  Injection Assessment: negative aspiration for heme, no paresthesia on injection and incremental injection  Patient Tolerance:comfortable throughout block  Complications:no

## 2023-01-03 NOTE — DISCHARGE INSTRUCTIONS
What to expect after a Nerve Block  Nerve blocks administered to block pain affect many types of nerves, including those nerves that control movement, pain, and normal sensation.  Following a nerve block, you may notice some bruising at the site where the block was given.  You may experience sensations such as:  numbness of the affected area or limb, tingling, heaviness (that is the limb feels heavy to you), weakness or inability to move the affected arm or leg, or a feeling as if your arm or leg has “fallen asleep”.    A nerve block can last from 9-18 hours depending on the medications used.  Usually the weakness wears off first followed by the tingling and heaviness.  As the block wears off, you may begin to notice pain; however, this sequence of events may occur in any order.  Typically, you will be able to move your limb before you will feel it.  Once a nerve block begins to wear off, the effects are usually completely gone within 60 minutes.    If you experience continued side effects that you believe are block related for longer than 48 hours, please call your healthcare provider.  Please see block-specific instructions below.    Instructions for any Block involving the leg/foot:  If you have had a leg/foot block, you should not bear weight on the affected leg until the block has worn off.  After the block has worn off, weight bearing should be as directed by your surgeon.  You may be sent home with crutches.  You are at high risk for falling because of the anesthetic effects on your leg.  Please use caution when standing or trying to move or walk.  Have someone assist you until your leg and foot function have returned to normal.    Protection of a “blocked” limb  After a nerve block, you cannot feel pain, pressure, or extremes of temperature in the affected limb.  And because of this, your blocked limb is at more risk for injury.  For example, it is possible to burn your limb on an extremely hot surface  without feeling it.  When resting, it is important to reposition your limb periodically to avoid prolonged pressure on it.  This may require the use of pillows and padding.  While sleeping, you should avoid rolling onto the affected limb or putting too much pressure on it.  If you have a cast or tight dressing, check the color of your toes of the affected limb.  Call your surgeon if they look discolored (that is, dusky, dark colored)  Use caution in cold weather.  Cover your limb appropriately to protect it from the cold.  Pain Management  Your surgeon will give you a prescription for pain medication.  Begin taking this before the nerve block wears off.  Bear in mind that sometimes the block can wear off in the middle of the night.

## 2023-01-03 NOTE — OP NOTE
ANKLE SUBTALAR ARTHRODESIS, HAMMER TOE REPAIR, ANKLE/FOOT HARDWARE REMOVAL  Procedure Note    Alex Gary  1/3/2023    Pre-op Diagnosis:   Malunion subtalar joint right, retained orthopedic hardware right, posttraumatic arthritis calcaneocuboid joint right, hammertoe deformities second third and fourth digits right    Post-op Diagnosis:     Post-Op Diagnosis Codes:     * Malunion of osteotomy site [M96.89]     * Retained orthopedic hardware [Z96.9]     * Post-traumatic arthritis of right foot [M19.171]     * Hammertoe of right foot [M20.41]     * Pain, foot [M79.673]     Procedure/CPT Codes:       Procedure(s):  1)  REPAIR OF MALUNION SUBTALAR ARTHRODESIS WITH REALIGNMENT OSTEOTOMY   2) calcaneocuboid joint arthrodesis with internal fixation  3) removal of hardware deep  4)  HAMMER TOE REPAIR AND PINNING OF SECOND DIGITS  5)  HAMMER TOE REPAIR AND PINNING OF SECOND DIGITS  6)  HAMMER TOE REPAIR AND PINNING OF SECOND DIGITS          Surgeon(s):  Sergei He DPM    Anesthesia: General    Staff:   Circulator: Kaylee Brown RN; Cuco Burroughs RN  Scrub Person: Enrique Jimenez; Giana Medina     was responsible for performing the following activities: Retraction and their skilled assistance was necessary for the success of this case.    Indications for procedure:  Pain    Procedure details:  The patient brought the operating room placed under general anesthesia.  The patient did have a preoperative regional block per anesthesia in preoperative area.  Right leg prepped and draped in usual sterile fashion.  Following procedures were performed.    Procedure #1 removal hardware deep    Was then directed posterior aspect patient's right heel where a linear incision was made.  Under fluoroscopic guidance the 2 previously placed screws were freed of soft tissue and bone and removed the operative site in toto.    Procedure #2 repair of malunion subtalar joint arthrodesis with realignment  osteotomy    Attention was then directed to the lateral heel.  There was a lateral extensile approach for calcaneal fracture incision.  The corner of this incision was elevated.  A periosteal elevator was then used to elevate periosteum from bone.  Sagittal saw was then utilized to create a through and through cut through the posterior calcaneus.  A second wedge was taken to allow for correction of the varus deformity.  Was then lateralized.  Guidewires were placed.  X-ray exam was performed.  2 [place] 28 headless screws were placed.  1 screw did course across the previous subtalar joint arthrodesis.  Closure performed in layers.    Procedure #3 calcaneocuboid joint arthrodesis right    Attention was then directed laterally to the hindfoot of the calcaneocuboid joint where a curvilinear incision was made.  Deep with sharp and blunt dissection technique.  The extensor digitorum brevis muscle belly was elevated.  Peroneal tendons were retracted.  Sagittal saw was used to resect prominent bone from the calcaneus.  Calcaneal cuboid joint was distracted open.  Wedge resection of bone was taken at the calcaneocuboid joint to allow for rotation as well as eversion of the midfoot.  This joint surface was fenestrated and temporally fixated.  With loading of the foot really that corrected the significant deformity that was present I did not feel like talonavicular joint arthrodesis was deemed necessary.  A [place] 28 5 hole plate was then placed laterally.  2 locking screws placed distally.  The nonlocking screw was placed compression slot proximally.  2 additional locking screws were placed.  X-ray exam was performed.  Wounds and irrigated closure performed layers.    Procedure #4 hammertoe repair with pinning second right foot    Attention was directed to the dorsal aspect of the second digit where 2 converging semielliptical incisions were placed longitudinally.  Intermediate skin was removed.  A transverse capsular  intended incision was made.  Head of the proximal phalanx and base the middle phalanx were free from soft tissue attachments.  A sagittal saw was used to resect a portion of the head of the proximal phalanx and resect the articular cartilage in the base the middle phalanx.  A 0.62 K wire was introduced into the base the middle phalanx driven out the distal aspect the digit then driven retrograde fashion the proximal phalanx.  X-ray exam was performed.  Was then bent cut and capped.  Closure formed layers.      Procedure #4 hammertoe repair with pinning third right foot    Attention was directed to the dorsal aspect of the third digit where 2 converging semielliptical incisions were placed longitudinally.  Intermediate skin was removed.  A transverse capsular intended incision was made.  Head of the proximal phalanx and base the middle phalanx were free from soft tissue attachments.  A sagittal saw was used to resect a portion of the head of the proximal phalanx and resect the articular cartilage in the base the middle phalanx.  A 0.62 K wire was introduced into the base the middle phalanx driven out the distal aspect the digit then driven retrograde fashion the proximal phalanx.  X-ray exam was performed.  Was then bent cut and capped.  Closure formed layers.      Procedure #4 hammertoe repair with pinning fourth right foot    Attention was directed to the dorsal aspect of the fourth digit where 2 converging semielliptical incisions were placed longitudinally.  Intermediate skin was removed.  A transverse capsular intended incision was made.  Head of the proximal phalanx and base the middle phalanx were free from soft tissue attachments.  A sagittal saw was used to resect a portion of the head of the proximal phalanx and resect the articular cartilage in the base the middle phalanx.  A 0.62K wire was introduced into the base the middle phalanx driven out the distal aspect the digit then driven retrograde fashion the  proximal phalanx.  X-ray exam was performed.  Was then bent cut and capped.  Closure formed layers.    A well-padded pressure bandage with posterior splint was applied.    Estimated Blood Loss: none    Specimens:                None      Drains: * No LDAs found *    Implants:   Implant Name Type Inv. Item Serial No.  Lot No. LRB No. Used Action   SCRW CLARENCE MONSTER NOHD SHT 16MM/THRD 7X55MM - [id number] Implant SCRW CLARENCE MONSTER NOHD SHT 16MM/THRD 7X55MM  [employer] 28  Right 1 Implanted   SCRW CLARENCE MONSTER NOHD SHT THRD 7X74MM - [id number] Implant SCRW CLARENCE MONSTER NOHD SHT THRD 7X74MM  [employer] 28  Right 1 Implanted   PLT COMPR DOGBONE 25MM - [id number] Implant PLT COMPR DOGBONE 25MM  [employer] 28  Right 1 Implanted   SCRW PLT R3CON LK 3.5X20MM - [id number] Implant SCRW PLT R3CON LK 3.5X20MM  [employer] 28  Right 1 Implanted   SCRW PLT R3CON LK 3.5X22MM - [id number] Implant SCRW PLT R3CON LK 3.5X22MM  [employer] 28  Right 1 Implanted   SCRW PLT R3CON NL 3.5X24MM - [id number] Implant SCRW PLT R3CON NL 3.5X24MM  [employer] 28  Right 1 Implanted   SCRW PLT R3CON LK 3.5X32MM - [id number] Implant SCRW PLT R3CON LK 3.5X32MM  [employer] 28  Right 2 Implanted   KWIRE SMOTH DBL/TROC .[id number] - [id number] Implant KWIRE SMOTH DBL/TROC .[id number]  [employer]  Right 3 Implanted        Complications: none    Follow up:   Nonweightbearing.  3 to 5 days for follow-up.  Prescriptions for Percocet and Phenergan were given.    Sergei He DPM     Date: 1/3/2023  Time: 13:24 CST

## 2023-01-11 ENCOUNTER — HOSPITAL ENCOUNTER (EMERGENCY)
Age: 33
Discharge: HOME | End: 2023-01-11
Attending: PEDIATRICS
Payer: MEDICARE

## 2023-01-11 VITALS
RESPIRATION RATE: 20 BRPM | SYSTOLIC BLOOD PRESSURE: 135 MMHG | HEART RATE: 107 BPM | DIASTOLIC BLOOD PRESSURE: 99 MMHG | OXYGEN SATURATION: 95 % | TEMPERATURE: 99.2 F

## 2023-01-11 DIAGNOSIS — F41.9 ANXIETY DISORDER, UNSPECIFIED TYPE: ICD-10-CM

## 2023-01-11 DIAGNOSIS — Z47.89 CAST DISCOMFORT: Primary | ICD-10-CM

## 2023-01-11 PROCEDURE — 99282 EMERGENCY DEPT VISIT SF MDM: CPT

## 2023-01-11 RX ORDER — OXYCODONE AND ACETAMINOPHEN 10; 325 MG/1; MG/1
1 TABLET ORAL EVERY 4 HOURS PRN
COMMUNITY

## 2023-01-11 RX ORDER — HYDROCODONE BITARTRATE AND ACETAMINOPHEN 7.5; 325 MG/1; MG/1
1 TABLET ORAL EVERY 6 HOURS PRN
COMMUNITY

## 2023-01-11 ASSESSMENT — PAIN - FUNCTIONAL ASSESSMENT: PAIN_FUNCTIONAL_ASSESSMENT: NONE - DENIES PAIN

## 2023-01-12 NOTE — DISCHARGE INSTRUCTIONS
Follow-up with your orthopedic specialist tomorrow morning. Keep leg elevated and use crutches. Return or seek medical attention with increasing or severe pain, redness, drainage, or other concerns.

## 2023-01-12 NOTE — ED PROVIDER NOTES
[facility]  eMERGENCY dEPARTMENT eNCOUnter      Pt Name: Oleg Villatoro  MRN: [id number]  [place] 1990  Date of evaluation: 1/11/2023  Provider: Live Jolly MD    [address] [place] [address]       Chief Complaint   Patient presents with    Leg Pain     Pt states that he was attempting to itch his leg inside his cast. Pt states that he was using a clothes  and a spatula. Pt states that he has both objects stuck in his cast. Pt states he is having increased pain. Pt states that he had surgery by Dr. Jaciel Thomason at [facility]   (Location/Symptom, Timing/Onset,Context/Setting, Quality, Duration, Modifying Factors, Severity)  Note limiting factors. Oleg Villatoro is a 27 y.o. male who presents to the emergency department ***     HPI    NursingNotes were reviewed. REVIEW OF SYSTEMS    (2-9 systems for level 4, 10 or more for level 5)     Review of Systems         PAST MEDICALHISTORY     Past Medical History:   Diagnosis Date    Anxiety     PTSD (post-traumatic stress disorder)          SURGICAL HISTORY       Past Surgical History:   Procedure Laterality Date    ANKLE SURGERY Right     ARM SURGERY Left     FOOT SURGERY Right     HIP SURGERY Right     WRIST SURGERY Left          CURRENT MEDICATIONS     Previous Medications    HYDROCODONE-ACETAMINOPHEN (NORCO) 7.5-325 MG PER TABLET    Take 1 tablet by mouth every 6 hours as needed for Pain. OXYCODONE-ACETAMINOPHEN (PERCOCET)  MG PER TABLET    Take 1 tablet by mouth every 4 hours as needed for Pain. ALLERGIES     Nuts [peanut-containing drug products]    FAMILY HISTORY     History reviewed. No pertinent family history.        SOCIAL HISTORY       Social History     Socioeconomic History    Marital status:      Spouse name: None    Number of children: None    Years of education: None    Highest education level: None   Tobacco Use    Smoking status: Every Day     Packs/day: 0.50 Types: Cigarettes    Smokeless tobacco: Never   Substance and Sexual Activity    Alcohol use: Not Currently    Drug use: Yes     Types: Marijuana (Weed)       SCREENINGS    [place] Coma Scale  Eye Opening: Spontaneous  Best Verbal Response: Oriented  Best Motor Response: Obeys commands  [place] Coma Scale Score: 15        PHYSICAL EXAM    (up to 7 for level 4, 8 or more for level 5)     ED Triage Vitals   BP Temp Temp Source Heart Rate Resp SpO2 Height Weight   01/11/23 2158 01/11/23 2157 01/11/23 2157 01/11/23 2157 01/11/23 2157 01/11/23 2157 -- --   (!) 135/99 99.2 °F (37.3 °C) Temporal (!) 107 20 95 %         Physical Exam    DIAGNOSTIC RESULTS     EKG: All EKG's areinterpreted by the Emergency Department Physician who either signs or Co-signs this chart in the absence of a cardiologist.    ***    RADIOLOGY:  Non-plain film images such as CT, Ultrasound and MRI are read by the radiologist. Plain radiographic images are visualized and preliminarily interpreted bythe emergency physician with the below findings:    ***      No orders to display           LABS:  Labs Reviewed - No data to display    All other labs were within normal range or not returned as of this dictation. EMERGENCY DEPARTMENT COURSE and DIFFERENTIAL DIAGNOSIS/MDM:   Vitals:    Vitals:    01/11/23 2157 01/11/23 2158   BP:  (!) 135/99   Pulse: (!) 107    Resp: 20    Temp: 99.2 °F (37.3 °C)    TempSrc: Temporal    SpO2: 95%        MDM      Reassessment  ***    CONSULTS:  None    PROCEDURES:  Unless otherwise noted below, none     Procedures    FINAL IMPRESSION    No diagnosis found. DISPOSITION/PLAN   DISPOSITION        PATIENT REFERRED TO:  No follow-up provider specified.     DISCHARGE MEDICATIONS:  New Prescriptions    No medications on file          (Please note that portions of this note were completed with a voice recognition program.  Efforts were made to edit thedictations but occasionally words are mis-transcribed.)    Pasha Grant MD (electronically signed)  Attending Emergency Physician Ultrasound and MRI are read by the radiologist. Plain radiographic images are visualized and preliminarily interpreted bythe emergency physician with the below findings:          No orders to display           LABS:  Labs Reviewed - No data to display    All other labs were within normal range or not returned as of this dictation. EMERGENCY DEPARTMENT COURSE and DIFFERENTIAL DIAGNOSIS/MDM:   Vitals:    Vitals:    01/11/23 2157 01/11/23 2158   BP:  (!) 135/99   Pulse: (!) 107    Resp: 20    Temp: 99.2 °F (37.3 °C)    TempSrc: Temporal    SpO2: 95%        MDM  70-year-old male presents to the emergency department after dropping a coat  and spatula into his new cast.  Patient displays significant anxiety and insists the cast be removed immediately. Patient states that he has history of PTSD and anxiety disorder and \"I can't handle the cast.\"  Patient states that he will follow-up with Dr. Bushra Valladares, orthopedic surgery at [facility], tomorrow morning. Patient allows me to replace the cast with this splint. Articles are removed after cast is cut off. Patient placed in splint and instructed to use crutches. Discussed with Dr. Bushra Valladares, orthopedics, who agrees to see patient in follow-up. CONSULTS:  None    PROCEDURES:  Unless otherwise noted below, none     Ortho Injury    Date/Time: 1/19/2023 4:23 AM  Performed by: Lia Ruiz MD  Authorized by: Lia Ruiz MD   Consent: Verbal consent obtained. Risks and benefits: risks, benefits and alternatives were discussed  Consent given by: patient  Patient understanding: patient states understanding of the procedure being performed  Patient consent: the patient's understanding of the procedure matches consent given  Procedure consent: procedure consent matches procedure scheduled  Patient identity confirmed: verbally with patient  Injury location: foot  Location details: right foot  Injury type: recent surgery.   Pre-procedure neurovascular assessment: neurovascularly intact  Pre-procedure distal perfusion: normal  Pre-procedure neurological function: normal  Pre-procedure range of motion: reduced    Anesthesia:  Local anesthesia used: no    Sedation:  Patient sedated: no    Immobilization: splint  Splint type: short leg  Post-procedure neurovascular assessment: post-procedure neurovascularly intact  Post-procedure distal perfusion: normal  Post-procedure neurological function: normal  Post-procedure range of motion: unchanged  Patient tolerance: patient tolerated the procedure well with no immediate complications        FINAL IMPRESSION      1. Cast discomfort    2. Anxiety disorder, unspecified type          DISPOSITION/PLAN   DISPOSITION Decision To Discharge 01/11/2023 10:47:58 PM      PATIENT REFERRED TO:  No follow-up provider specified.     DISCHARGE MEDICATIONS:  Discharge Medication List as of 1/11/2023 10:56 PM             (Please note that portions of this note were completed with a voice recognition program.  Efforts were made to edit thedictations but occasionally words are mis-transcribed.)    Mel Faria MD (electronically signed)  Attending Emergency Physician          Mel Faria MD  01/19/23 [id number] Valerie Patino MD  01/19/23 5152

## (undated) DEVICE — SUT ETHLN 3/0 FS1 30IN 669H

## (undated) DEVICE — COUNTERSINK, 7.0 HEADLESS: Brand: MONSTER® SCREW SYSTEM

## (undated) DEVICE — SKIN PREP TRAY W/CHG: Brand: MEDLINE INDUSTRIES, INC.

## (undated) DEVICE — UNDERCAST PADDING: Brand: DEROYAL

## (undated) DEVICE — DISPOSABLE TOURNIQUET CUFF SINGLE BLADDER, SINGLE PORT AND QUICK CONNECT CONNECTOR: Brand: COLOR CUFF

## (undated) DEVICE — TRAP FLD MINIVAC MEGADYNE 100ML

## (undated) DEVICE — SPNG GZ WOVN 4X4IN 12PLY 10/BX STRL

## (undated) DEVICE — CVR UNIV C/ARM

## (undated) DEVICE — JOINT PREP INSTRUMENT KIT: Brand: ORTHOLOC™ 2

## (undated) DEVICE — BANDAGE,GAUZE,BULKEE II,4.5"X4.1YD,STRL: Brand: MEDLINE

## (undated) DEVICE — ANTIBACTERIAL UNDYED BRAIDED (POLYGLACTIN 910), SYNTHETIC ABSORBABLE SUTURE: Brand: COATED VICRYL

## (undated) DEVICE — 4-PORT MANIFOLD: Brand: NEPTUNE 2

## (undated) DEVICE — PRECISION THIN (9.0 X 0.38 X 31.0MM)

## (undated) DEVICE — 4.0MM EGG BUR

## (undated) DEVICE — BNDG ELAS ECON W/CLIP 4IN 5YD LF STRL

## (undated) DEVICE — BAPTIST TURNOVER KIT: Brand: MEDLINE INDUSTRIES, INC.

## (undated) DEVICE — DRILL,  2.4 X 140MM, SOLID, MEASURING, LONG, AO: Brand: BABY GORILLA®/GORILLA® PLATING SYSTEM

## (undated) DEVICE — OLIVE WIRE, SMOOTH, 1.4MM
Type: IMPLANTABLE DEVICE | Site: FOOT | Status: NON-FUNCTIONAL
Brand: BABY GORILLA/GORILLA PLATING SYSTEM
Removed: 2023-01-03

## (undated) DEVICE — 7.0 X 60 MM HEADLESS, CANNULATED, SHORT THREAD SCREW
Type: IMPLANTABLE DEVICE | Site: FOOT | Status: NON-FUNCTIONAL
Brand: MONSTER SCREW SYSTEM
Removed: 2023-01-03

## (undated) DEVICE — BNDG ELAS W/CLIP 6IN 10YD LF STRL

## (undated) DEVICE — 1010 S-DRAPE TOWEL DRAPE 10/BX: Brand: STERI-DRAPE™

## (undated) DEVICE — K-WIRE, SINGLE ENDED TROCAR TIP, SMOOTH, 2.3 X 230MM
Type: IMPLANTABLE DEVICE | Site: FOOT | Status: NON-FUNCTIONAL
Brand: MONSTER SCREW SYSTEM
Removed: 2023-01-03

## (undated) DEVICE — HANDPIECE SET WITH HIGH FLOW TIP AND SUCTION TUBE: Brand: INTERPULSE

## (undated) DEVICE — DRP C/ARMOR

## (undated) DEVICE — DRILL, 4.6 X 220MM, CANNULATED, 3/16" SQ. CONNECTION: Brand: MONSTER® SCREW SYSTEM

## (undated) DEVICE — APPL DURAPREP IODOPHOR APL 26ML

## (undated) DEVICE — PK EXTRM 30

## (undated) DEVICE — INTENDED FOR TISSUE SEPARATION, AND OTHER PROCEDURES THAT REQUIRE A SHARP SURGICAL BLADE TO PUNCTURE OR CUT.: Brand: BARD-PARKER ® STAINLESS STEEL BLADES

## (undated) DEVICE — BONE FENESTRATION PERFORATOR: Brand: BABY GORILLA®/GORILLA® PLATING SYSTEM

## (undated) DEVICE — BNDG CURAD ADHS 4IN WHT

## (undated) DEVICE — PRECISION THIN (9.0 X 0.38 X 25.0MM)

## (undated) DEVICE — BALL PIN JURGAN .062 GRN

## (undated) DEVICE — GLV SURG SENSICARE PI ORTHO SZ8 LF STRL

## (undated) DEVICE — COVER,MAYO STAND,STERILE: Brand: MEDLINE

## (undated) DEVICE — FRAZIER SUCTION INSTRUMENT 10 FR W/CONTROL VENT & OBTURATOR: Brand: FRAZIER

## (undated) DEVICE — DRSNG GZ CURAD XEROFORM NONADHS 5X9IN STRL

## (undated) DEVICE — KT INST PROC DYNACLIP UNIV DISP STRL

## (undated) DEVICE — BNDG ESMARK STRL 6INX12FT LF